# Patient Record
Sex: FEMALE | Race: WHITE | NOT HISPANIC OR LATINO | Employment: OTHER | URBAN - METROPOLITAN AREA
[De-identification: names, ages, dates, MRNs, and addresses within clinical notes are randomized per-mention and may not be internally consistent; named-entity substitution may affect disease eponyms.]

---

## 2021-01-01 ENCOUNTER — HOSPICE ADMISSION (OUTPATIENT)
Dept: HOSPICE | Facility: HOSPICE | Age: 66
End: 2021-01-01
Payer: MEDICARE

## 2021-01-01 ENCOUNTER — HOME CARE VISIT (OUTPATIENT)
Dept: HOSPICE | Facility: HOSPICE | Age: 66
End: 2021-01-01
Payer: MEDICARE

## 2021-01-01 ENCOUNTER — APPOINTMENT (OUTPATIENT)
Dept: RADIOLOGY | Facility: MEDICAL CENTER | Age: 66
DRG: 640 | End: 2021-01-01
Attending: INTERNAL MEDICINE
Payer: MEDICARE

## 2021-01-01 ENCOUNTER — APPOINTMENT (OUTPATIENT)
Dept: RADIOLOGY | Facility: MEDICAL CENTER | Age: 66
DRG: 640 | End: 2021-01-01
Attending: STUDENT IN AN ORGANIZED HEALTH CARE EDUCATION/TRAINING PROGRAM
Payer: MEDICARE

## 2021-01-01 ENCOUNTER — HOSPITAL ENCOUNTER (INPATIENT)
Facility: MEDICAL CENTER | Age: 66
LOS: 3 days | DRG: 640 | End: 2021-06-05
Attending: EMERGENCY MEDICINE | Admitting: STUDENT IN AN ORGANIZED HEALTH CARE EDUCATION/TRAINING PROGRAM
Payer: MEDICARE

## 2021-01-01 ENCOUNTER — APPOINTMENT (OUTPATIENT)
Dept: CARDIOLOGY | Facility: MEDICAL CENTER | Age: 66
DRG: 640 | End: 2021-01-01
Attending: INTERNAL MEDICINE
Payer: MEDICARE

## 2021-01-01 VITALS
DIASTOLIC BLOOD PRESSURE: 70 MMHG | WEIGHT: 87.3 LBS | HEIGHT: 61 IN | HEART RATE: 107 BPM | TEMPERATURE: 99.5 F | SYSTOLIC BLOOD PRESSURE: 115 MMHG | OXYGEN SATURATION: 93 % | RESPIRATION RATE: 14 BRPM | BODY MASS INDEX: 16.48 KG/M2

## 2021-01-01 DIAGNOSIS — E87.6 HYPOKALEMIA: ICD-10-CM

## 2021-01-01 DIAGNOSIS — R64 EMACIATED (HCC): ICD-10-CM

## 2021-01-01 DIAGNOSIS — N17.9 AKI (ACUTE KIDNEY INJURY) (HCC): ICD-10-CM

## 2021-01-01 DIAGNOSIS — E87.0 HYPERNATREMIA: ICD-10-CM

## 2021-01-01 DIAGNOSIS — M06.9 RHEUMATOID ARTHRITIS, INVOLVING UNSPECIFIED SITE, UNSPECIFIED WHETHER RHEUMATOID FACTOR PRESENT (HCC): ICD-10-CM

## 2021-01-01 DIAGNOSIS — E87.8 ELECTROLYTE ABNORMALITY: ICD-10-CM

## 2021-01-01 DIAGNOSIS — G93.49 UREMIC ENCEPHALOPATHY: ICD-10-CM

## 2021-01-01 DIAGNOSIS — N19 UREMIC ENCEPHALOPATHY: ICD-10-CM

## 2021-01-01 DIAGNOSIS — E43 SEVERE PROTEIN-CALORIE MALNUTRITION (HCC): ICD-10-CM

## 2021-01-01 LAB
ALBUMIN SERPL BCP-MCNC: 2.5 G/DL (ref 3.2–4.9)
ALBUMIN SERPL BCP-MCNC: 3 G/DL (ref 3.2–4.9)
ALBUMIN/GLOB SERPL: 1 G/DL
ALBUMIN/GLOB SERPL: 1.1 G/DL
ALP SERPL-CCNC: 44 U/L (ref 30–99)
ALP SERPL-CCNC: 57 U/L (ref 30–99)
ALT SERPL-CCNC: 78 U/L (ref 2–50)
ALT SERPL-CCNC: 91 U/L (ref 2–50)
AMORPH CRY #/AREA URNS HPF: PRESENT /HPF
ANION GAP SERPL CALC-SCNC: 12 MMOL/L (ref 7–16)
ANION GAP SERPL CALC-SCNC: 13 MMOL/L (ref 7–16)
ANION GAP SERPL CALC-SCNC: 5 MMOL/L (ref 7–16)
ANION GAP SERPL CALC-SCNC: 7 MMOL/L (ref 7–16)
ANION GAP SERPL CALC-SCNC: 9 MMOL/L (ref 7–16)
ANION GAP SERPL CALC-SCNC: 9 MMOL/L (ref 7–16)
APPEARANCE UR: CLEAR
AST SERPL-CCNC: 75 U/L (ref 12–45)
AST SERPL-CCNC: 76 U/L (ref 12–45)
BACTERIA #/AREA URNS HPF: NEGATIVE /HPF
BASOPHILS # BLD AUTO: 0.1 % (ref 0–1.8)
BASOPHILS # BLD AUTO: 0.1 % (ref 0–1.8)
BASOPHILS # BLD: 0.01 K/UL (ref 0–0.12)
BASOPHILS # BLD: 0.01 K/UL (ref 0–0.12)
BILIRUB SERPL-MCNC: 0.2 MG/DL (ref 0.1–1.5)
BILIRUB SERPL-MCNC: 0.3 MG/DL (ref 0.1–1.5)
BILIRUB UR QL STRIP.AUTO: NEGATIVE
BUN SERPL-MCNC: 14 MG/DL (ref 8–22)
BUN SERPL-MCNC: 19 MG/DL (ref 8–22)
BUN SERPL-MCNC: 28 MG/DL (ref 8–22)
BUN SERPL-MCNC: 39 MG/DL (ref 8–22)
BUN SERPL-MCNC: 42 MG/DL (ref 8–22)
BUN SERPL-MCNC: 48 MG/DL (ref 8–22)
BUN SERPL-MCNC: 61 MG/DL (ref 8–22)
BUN SERPL-MCNC: 76 MG/DL (ref 8–22)
BUN SERPL-MCNC: 80 MG/DL (ref 8–22)
CA-I SERPL-SCNC: 1 MMOL/L (ref 1.1–1.3)
CA-I SERPL-SCNC: 1.1 MMOL/L (ref 1.1–1.3)
CA-I SERPL-SCNC: 1.1 MMOL/L (ref 1.1–1.3)
CA-I SERPL-SCNC: 1.2 MMOL/L (ref 1.1–1.3)
CALCIUM SERPL-MCNC: 7.2 MG/DL (ref 8.5–10.5)
CALCIUM SERPL-MCNC: 7.6 MG/DL (ref 8.5–10.5)
CALCIUM SERPL-MCNC: 7.8 MG/DL (ref 8.5–10.5)
CALCIUM SERPL-MCNC: 7.8 MG/DL (ref 8.5–10.5)
CALCIUM SERPL-MCNC: 8.1 MG/DL (ref 8.5–10.5)
CALCIUM SERPL-MCNC: 8.2 MG/DL (ref 8.5–10.5)
CALCIUM SERPL-MCNC: 8.3 MG/DL (ref 8.5–10.5)
CALCIUM SERPL-MCNC: 8.5 MG/DL (ref 8.5–10.5)
CALCIUM SERPL-MCNC: 8.7 MG/DL (ref 8.5–10.5)
CHLORIDE SERPL-SCNC: 111 MMOL/L (ref 96–112)
CHLORIDE SERPL-SCNC: 115 MMOL/L (ref 96–112)
CHLORIDE SERPL-SCNC: 117 MMOL/L (ref 96–112)
CHLORIDE SERPL-SCNC: 117 MMOL/L (ref 96–112)
CHLORIDE SERPL-SCNC: 118 MMOL/L (ref 96–112)
CHLORIDE SERPL-SCNC: 118 MMOL/L (ref 96–112)
CHLORIDE SERPL-SCNC: 120 MMOL/L (ref 96–112)
CHLORIDE SERPL-SCNC: 121 MMOL/L (ref 96–112)
CHLORIDE SERPL-SCNC: 122 MMOL/L (ref 96–112)
CK SERPL-CCNC: 1566 U/L (ref 0–154)
CO2 SERPL-SCNC: 35 MMOL/L (ref 20–33)
CO2 SERPL-SCNC: 35 MMOL/L (ref 20–33)
CO2 SERPL-SCNC: 36 MMOL/L (ref 20–33)
CO2 SERPL-SCNC: 36 MMOL/L (ref 20–33)
CO2 SERPL-SCNC: 37 MMOL/L (ref 20–33)
CO2 SERPL-SCNC: 37 MMOL/L (ref 20–33)
CO2 SERPL-SCNC: 38 MMOL/L (ref 20–33)
CO2 SERPL-SCNC: 38 MMOL/L (ref 20–33)
CO2 SERPL-SCNC: 40 MMOL/L (ref 20–33)
COLOR UR: YELLOW
CREAT SERPL-MCNC: 0.38 MG/DL (ref 0.5–1.4)
CREAT SERPL-MCNC: 0.41 MG/DL (ref 0.5–1.4)
CREAT SERPL-MCNC: 0.48 MG/DL (ref 0.5–1.4)
CREAT SERPL-MCNC: 0.59 MG/DL (ref 0.5–1.4)
CREAT SERPL-MCNC: 0.62 MG/DL (ref 0.5–1.4)
CREAT SERPL-MCNC: 0.73 MG/DL (ref 0.5–1.4)
CREAT SERPL-MCNC: 0.76 MG/DL (ref 0.5–1.4)
CREAT SERPL-MCNC: 0.96 MG/DL (ref 0.5–1.4)
CREAT SERPL-MCNC: 1.03 MG/DL (ref 0.5–1.4)
CRP SERPL HS-MCNC: 0.41 MG/DL (ref 0–0.75)
CRP SERPL HS-MCNC: 0.49 MG/DL (ref 0–0.75)
EKG IMPRESSION: NORMAL
EKG IMPRESSION: NORMAL
EOSINOPHIL # BLD AUTO: 0.01 K/UL (ref 0–0.51)
EOSINOPHIL # BLD AUTO: 0.57 K/UL (ref 0–0.51)
EOSINOPHIL NFR BLD: 0.1 % (ref 0–6.9)
EOSINOPHIL NFR BLD: 5.1 % (ref 0–6.9)
EPI CELLS #/AREA URNS HPF: ABNORMAL /HPF
ERYTHROCYTE [DISTWIDTH] IN BLOOD BY AUTOMATED COUNT: 56.6 FL (ref 35.9–50)
ERYTHROCYTE [DISTWIDTH] IN BLOOD BY AUTOMATED COUNT: 59.2 FL (ref 35.9–50)
GLOBULIN SER CALC-MCNC: 2.3 G/DL (ref 1.9–3.5)
GLOBULIN SER CALC-MCNC: 2.9 G/DL (ref 1.9–3.5)
GLUCOSE SERPL-MCNC: 107 MG/DL (ref 65–99)
GLUCOSE SERPL-MCNC: 108 MG/DL (ref 65–99)
GLUCOSE SERPL-MCNC: 131 MG/DL (ref 65–99)
GLUCOSE SERPL-MCNC: 132 MG/DL (ref 65–99)
GLUCOSE SERPL-MCNC: 137 MG/DL (ref 65–99)
GLUCOSE SERPL-MCNC: 154 MG/DL (ref 65–99)
GLUCOSE SERPL-MCNC: 158 MG/DL (ref 65–99)
GLUCOSE SERPL-MCNC: 167 MG/DL (ref 65–99)
GLUCOSE SERPL-MCNC: 167 MG/DL (ref 65–99)
GLUCOSE UR STRIP.AUTO-MCNC: NEGATIVE MG/DL
HCT VFR BLD AUTO: 33.8 % (ref 37–47)
HCT VFR BLD AUTO: 40.2 % (ref 37–47)
HGB BLD-MCNC: 10.5 G/DL (ref 12–16)
HGB BLD-MCNC: 12.8 G/DL (ref 12–16)
HYALINE CASTS #/AREA URNS LPF: ABNORMAL /LPF
IMM GRANULOCYTES # BLD AUTO: 0.08 K/UL (ref 0–0.11)
IMM GRANULOCYTES # BLD AUTO: 0.16 K/UL (ref 0–0.11)
IMM GRANULOCYTES NFR BLD AUTO: 0.7 % (ref 0–0.9)
IMM GRANULOCYTES NFR BLD AUTO: 0.9 % (ref 0–0.9)
KETONES UR STRIP.AUTO-MCNC: NEGATIVE MG/DL
LEUKOCYTE ESTERASE UR QL STRIP.AUTO: NEGATIVE
LV EJECT FRACT  99904: 65
LV EJECT FRACT MOD 4C 99902: 65.47
LYMPHOCYTES # BLD AUTO: 1.17 K/UL (ref 1–4.8)
LYMPHOCYTES # BLD AUTO: 1.2 K/UL (ref 1–4.8)
LYMPHOCYTES NFR BLD: 10.8 % (ref 22–41)
LYMPHOCYTES NFR BLD: 6.9 % (ref 22–41)
MAGNESIUM SERPL-MCNC: 2.1 MG/DL (ref 1.5–2.5)
MAGNESIUM SERPL-MCNC: 2.2 MG/DL (ref 1.5–2.5)
MAGNESIUM SERPL-MCNC: 2.4 MG/DL (ref 1.5–2.5)
MAGNESIUM SERPL-MCNC: 2.5 MG/DL (ref 1.5–2.5)
MAGNESIUM SERPL-MCNC: 2.9 MG/DL (ref 1.5–2.5)
MAGNESIUM SERPL-MCNC: 2.9 MG/DL (ref 1.5–2.5)
MAGNESIUM SERPL-MCNC: 3.1 MG/DL (ref 1.5–2.5)
MCH RBC QN AUTO: 29.8 PG (ref 27–33)
MCH RBC QN AUTO: 29.9 PG (ref 27–33)
MCHC RBC AUTO-ENTMCNC: 31.1 G/DL (ref 33.6–35)
MCHC RBC AUTO-ENTMCNC: 31.8 G/DL (ref 33.6–35)
MCV RBC AUTO: 93.5 FL (ref 81.4–97.8)
MCV RBC AUTO: 96.3 FL (ref 81.4–97.8)
MICRO URNS: ABNORMAL
MONOCYTES # BLD AUTO: 0.33 K/UL (ref 0–0.85)
MONOCYTES # BLD AUTO: 0.74 K/UL (ref 0–0.85)
MONOCYTES NFR BLD AUTO: 3 % (ref 0–13.4)
MONOCYTES NFR BLD AUTO: 4.3 % (ref 0–13.4)
NEUTROPHILS # BLD AUTO: 14.98 K/UL (ref 2–7.15)
NEUTROPHILS # BLD AUTO: 8.9 K/UL (ref 2–7.15)
NEUTROPHILS NFR BLD: 80.3 % (ref 44–72)
NEUTROPHILS NFR BLD: 87.7 % (ref 44–72)
NITRITE UR QL STRIP.AUTO: NEGATIVE
NRBC # BLD AUTO: 0 K/UL
NRBC # BLD AUTO: 0 K/UL
NRBC BLD-RTO: 0 /100 WBC
NRBC BLD-RTO: 0 /100 WBC
PH UR STRIP.AUTO: 6 [PH] (ref 5–8)
PHOSPHATE SERPL-MCNC: 1.5 MG/DL (ref 2.5–4.5)
PHOSPHATE SERPL-MCNC: 1.6 MG/DL (ref 2.5–4.5)
PHOSPHATE SERPL-MCNC: 2.1 MG/DL (ref 2.5–4.5)
PHOSPHATE SERPL-MCNC: 2.6 MG/DL (ref 2.5–4.5)
PHOSPHATE SERPL-MCNC: 3.1 MG/DL (ref 2.5–4.5)
PHOSPHATE SERPL-MCNC: 3.3 MG/DL (ref 2.5–4.5)
PHOSPHATE SERPL-MCNC: 4.7 MG/DL (ref 2.5–4.5)
PLATELET # BLD AUTO: 174 K/UL (ref 164–446)
PLATELET # BLD AUTO: 241 K/UL (ref 164–446)
PMV BLD AUTO: 12.2 FL (ref 9–12.9)
PMV BLD AUTO: 12.6 FL (ref 9–12.9)
POTASSIUM SERPL-SCNC: 2.1 MMOL/L (ref 3.6–5.5)
POTASSIUM SERPL-SCNC: 2.2 MMOL/L (ref 3.6–5.5)
POTASSIUM SERPL-SCNC: 3 MMOL/L (ref 3.6–5.5)
POTASSIUM SERPL-SCNC: 3.1 MMOL/L (ref 3.6–5.5)
POTASSIUM SERPL-SCNC: 3.2 MMOL/L (ref 3.6–5.5)
POTASSIUM SERPL-SCNC: 3.5 MMOL/L (ref 3.6–5.5)
POTASSIUM SERPL-SCNC: 3.9 MMOL/L (ref 3.6–5.5)
POTASSIUM SERPL-SCNC: 4.1 MMOL/L (ref 3.6–5.5)
POTASSIUM SERPL-SCNC: <1.5 MMOL/L (ref 3.6–5.5)
PREALB SERPL-MCNC: 12.1 MG/DL (ref 18–38)
PROT SERPL-MCNC: 4.8 G/DL (ref 6–8.2)
PROT SERPL-MCNC: 5.9 G/DL (ref 6–8.2)
PROT UR QL STRIP: 30 MG/DL
RBC # BLD AUTO: 3.51 M/UL (ref 4.2–5.4)
RBC # BLD AUTO: 4.3 M/UL (ref 4.2–5.4)
RBC # URNS HPF: ABNORMAL /HPF
RBC UR QL AUTO: ABNORMAL
SODIUM SERPL-SCNC: 158 MMOL/L (ref 135–145)
SODIUM SERPL-SCNC: 158 MMOL/L (ref 135–145)
SODIUM SERPL-SCNC: 160 MMOL/L (ref 135–145)
SODIUM SERPL-SCNC: 161 MMOL/L (ref 135–145)
SODIUM SERPL-SCNC: 162 MMOL/L (ref 135–145)
SODIUM SERPL-SCNC: 162 MMOL/L (ref 135–145)
SODIUM SERPL-SCNC: 164 MMOL/L (ref 135–145)
SODIUM SERPL-SCNC: 170 MMOL/L (ref 135–145)
SODIUM SERPL-SCNC: 172 MMOL/L (ref 135–145)
SP GR UR STRIP.AUTO: 1.01
TROPONIN T SERPL-MCNC: 101 NG/L (ref 6–19)
TROPONIN T SERPL-MCNC: 106 NG/L (ref 6–19)
TROPONIN T SERPL-MCNC: 152 NG/L (ref 6–19)
TROPONIN T SERPL-MCNC: 94 NG/L (ref 6–19)
UROBILINOGEN UR STRIP.AUTO-MCNC: 0.2 MG/DL
WBC # BLD AUTO: 11.1 K/UL (ref 4.8–10.8)
WBC # BLD AUTO: 17.1 K/UL (ref 4.8–10.8)
WBC #/AREA URNS HPF: ABNORMAL /HPF

## 2021-01-01 PROCEDURE — 700101 HCHG RX REV CODE 250: Performed by: STUDENT IN AN ORGANIZED HEALTH CARE EDUCATION/TRAINING PROGRAM

## 2021-01-01 PROCEDURE — 80048 BASIC METABOLIC PNL TOTAL CA: CPT

## 2021-01-01 PROCEDURE — 700105 HCHG RX REV CODE 258: Performed by: INTERNAL MEDICINE

## 2021-01-01 PROCEDURE — 83735 ASSAY OF MAGNESIUM: CPT | Mod: 91

## 2021-01-01 PROCEDURE — 700101 HCHG RX REV CODE 250: Performed by: INTERNAL MEDICINE

## 2021-01-01 PROCEDURE — 99232 SBSQ HOSP IP/OBS MODERATE 35: CPT | Performed by: HOSPITALIST

## 2021-01-01 PROCEDURE — 770004 HCHG ROOM/CARE - ONCOLOGY PRIVATE *

## 2021-01-01 PROCEDURE — 700111 HCHG RX REV CODE 636 W/ 250 OVERRIDE (IP): Performed by: HOSPITALIST

## 2021-01-01 PROCEDURE — 80053 COMPREHEN METABOLIC PANEL: CPT

## 2021-01-01 PROCEDURE — 81001 URINALYSIS AUTO W/SCOPE: CPT

## 2021-01-01 PROCEDURE — 76937 US GUIDE VASCULAR ACCESS: CPT

## 2021-01-01 PROCEDURE — 700111 HCHG RX REV CODE 636 W/ 250 OVERRIDE (IP): Performed by: INTERNAL MEDICINE

## 2021-01-01 PROCEDURE — 99233 SBSQ HOSP IP/OBS HIGH 50: CPT | Performed by: HOSPITALIST

## 2021-01-01 PROCEDURE — 86140 C-REACTIVE PROTEIN: CPT

## 2021-01-01 PROCEDURE — 85025 COMPLETE CBC W/AUTO DIFF WBC: CPT

## 2021-01-01 PROCEDURE — B54MZZA ULTRASONOGRAPHY OF RIGHT UPPER EXTREMITY VEINS, GUIDANCE: ICD-10-PCS | Performed by: INTERNAL MEDICINE

## 2021-01-01 PROCEDURE — 93010 ELECTROCARDIOGRAM REPORT: CPT | Performed by: INTERNAL MEDICINE

## 2021-01-01 PROCEDURE — 84100 ASSAY OF PHOSPHORUS: CPT | Mod: 91

## 2021-01-01 PROCEDURE — 71045 X-RAY EXAM CHEST 1 VIEW: CPT

## 2021-01-01 PROCEDURE — 84100 ASSAY OF PHOSPHORUS: CPT

## 2021-01-01 PROCEDURE — 84484 ASSAY OF TROPONIN QUANT: CPT

## 2021-01-01 PROCEDURE — 97162 PT EVAL MOD COMPLEX 30 MIN: CPT

## 2021-01-01 PROCEDURE — 97166 OT EVAL MOD COMPLEX 45 MIN: CPT

## 2021-01-01 PROCEDURE — 93005 ELECTROCARDIOGRAM TRACING: CPT | Performed by: INTERNAL MEDICINE

## 2021-01-01 PROCEDURE — 82330 ASSAY OF CALCIUM: CPT

## 2021-01-01 PROCEDURE — 94760 N-INVAS EAR/PLS OXIMETRY 1: CPT

## 2021-01-01 PROCEDURE — 99233 SBSQ HOSP IP/OBS HIGH 50: CPT | Performed by: NURSE PRACTITIONER

## 2021-01-01 PROCEDURE — 302136 NUTRITION PUMP: Performed by: INTERNAL MEDICINE

## 2021-01-01 PROCEDURE — 82330 ASSAY OF CALCIUM: CPT | Mod: 91

## 2021-01-01 PROCEDURE — 70450 CT HEAD/BRAIN W/O DYE: CPT

## 2021-01-01 PROCEDURE — A9270 NON-COVERED ITEM OR SERVICE: HCPCS | Performed by: INTERNAL MEDICINE

## 2021-01-01 PROCEDURE — 99233 SBSQ HOSP IP/OBS HIGH 50: CPT | Performed by: INTERNAL MEDICINE

## 2021-01-01 PROCEDURE — 93005 ELECTROCARDIOGRAM TRACING: CPT | Performed by: EMERGENCY MEDICINE

## 2021-01-01 PROCEDURE — 99291 CRITICAL CARE FIRST HOUR: CPT

## 2021-01-01 PROCEDURE — 93306 TTE W/DOPPLER COMPLETE: CPT | Mod: 26 | Performed by: INTERNAL MEDICINE

## 2021-01-01 PROCEDURE — 96374 THER/PROPH/DIAG INJ IV PUSH: CPT

## 2021-01-01 PROCEDURE — 700111 HCHG RX REV CODE 636 W/ 250 OVERRIDE (IP): Performed by: STUDENT IN AN ORGANIZED HEALTH CARE EDUCATION/TRAINING PROGRAM

## 2021-01-01 PROCEDURE — 05HY33Z INSERTION OF INFUSION DEVICE INTO UPPER VEIN, PERCUTANEOUS APPROACH: ICD-10-PCS | Performed by: INTERNAL MEDICINE

## 2021-01-01 PROCEDURE — 84134 ASSAY OF PREALBUMIN: CPT

## 2021-01-01 PROCEDURE — 700102 HCHG RX REV CODE 250 W/ 637 OVERRIDE(OP): Performed by: INTERNAL MEDICINE

## 2021-01-01 PROCEDURE — 306565 RIGID MIT RESTRAINT(PAIR): Performed by: INTERNAL MEDICINE

## 2021-01-01 PROCEDURE — 82550 ASSAY OF CK (CPK): CPT

## 2021-01-01 PROCEDURE — 99291 CRITICAL CARE FIRST HOUR: CPT | Performed by: INTERNAL MEDICINE

## 2021-01-01 PROCEDURE — 80048 BASIC METABOLIC PNL TOTAL CA: CPT | Mod: 91

## 2021-01-01 PROCEDURE — 770001 HCHG ROOM/CARE - MED/SURG/GYN PRIV*

## 2021-01-01 PROCEDURE — 99291 CRITICAL CARE FIRST HOUR: CPT | Performed by: STUDENT IN AN ORGANIZED HEALTH CARE EDUCATION/TRAINING PROGRAM

## 2021-01-01 PROCEDURE — 99292 CRITICAL CARE ADDL 30 MIN: CPT | Performed by: INTERNAL MEDICINE

## 2021-01-01 PROCEDURE — 770022 HCHG ROOM/CARE - ICU (200)

## 2021-01-01 PROCEDURE — 36415 COLL VENOUS BLD VENIPUNCTURE: CPT

## 2021-01-01 PROCEDURE — 93306 TTE W/DOPPLER COMPLETE: CPT

## 2021-01-01 RX ORDER — POTASSIUM CHLORIDE 7.45 MG/ML
10 INJECTION INTRAVENOUS
Status: COMPLETED | OUTPATIENT
Start: 2021-01-01 | End: 2021-01-01

## 2021-01-01 RX ORDER — LEVETIRACETAM 5 MG/ML
500 INJECTION INTRAVASCULAR EVERY 12 HOURS
Status: DISCONTINUED | OUTPATIENT
Start: 2021-01-01 | End: 2021-01-01 | Stop reason: HOSPADM

## 2021-01-01 RX ORDER — ONDANSETRON 4 MG/1
4 TABLET, ORALLY DISINTEGRATING ORAL EVERY 4 HOURS PRN
Status: DISCONTINUED | OUTPATIENT
Start: 2021-01-01 | End: 2021-01-01

## 2021-01-01 RX ORDER — ACETAMINOPHEN 325 MG/1
650 TABLET ORAL EVERY 6 HOURS PRN
Status: DISCONTINUED | OUTPATIENT
Start: 2021-01-01 | End: 2021-01-01

## 2021-01-01 RX ORDER — CALCIUM CHLORIDE 100 MG/ML
1 INJECTION INTRAVENOUS; INTRAVENTRICULAR ONCE
Status: DISCONTINUED | OUTPATIENT
Start: 2021-01-01 | End: 2021-01-01

## 2021-01-01 RX ORDER — ONDANSETRON 8 MG/1
8 TABLET, ORALLY DISINTEGRATING ORAL EVERY 8 HOURS PRN
Status: DISCONTINUED | OUTPATIENT
Start: 2021-01-01 | End: 2021-01-01 | Stop reason: HOSPADM

## 2021-01-01 RX ORDER — LEVETIRACETAM 100 MG/ML
1500 SOLUTION ORAL 2 TIMES DAILY
COMMUNITY

## 2021-01-01 RX ORDER — HALOPERIDOL 5 MG/ML
1-2 INJECTION INTRAMUSCULAR EVERY 4 HOURS PRN
Status: DISCONTINUED | OUTPATIENT
Start: 2021-01-01 | End: 2021-01-01

## 2021-01-01 RX ORDER — AMOXICILLIN 250 MG
2 CAPSULE ORAL 2 TIMES DAILY
Status: DISCONTINUED | OUTPATIENT
Start: 2021-01-01 | End: 2021-01-01

## 2021-01-01 RX ORDER — DEXTROSE MONOHYDRATE 50 MG/ML
INJECTION, SOLUTION INTRAVENOUS CONTINUOUS
Status: DISCONTINUED | OUTPATIENT
Start: 2021-01-01 | End: 2021-01-01

## 2021-01-01 RX ORDER — ACETAMINOPHEN 650 MG/1
650 SUPPOSITORY RECTAL EVERY 4 HOURS PRN
Status: DISCONTINUED | OUTPATIENT
Start: 2021-01-01 | End: 2021-01-01 | Stop reason: HOSPADM

## 2021-01-01 RX ORDER — SODIUM CHLORIDE, SODIUM LACTATE, POTASSIUM CHLORIDE, CALCIUM CHLORIDE 600; 310; 30; 20 MG/100ML; MG/100ML; MG/100ML; MG/100ML
INJECTION, SOLUTION INTRAVENOUS CONTINUOUS
Status: DISCONTINUED | OUTPATIENT
Start: 2021-01-01 | End: 2021-01-01

## 2021-01-01 RX ORDER — BISACODYL 10 MG
10 SUPPOSITORY, RECTAL RECTAL
Status: DISCONTINUED | OUTPATIENT
Start: 2021-01-01 | End: 2021-01-01 | Stop reason: HOSPADM

## 2021-01-01 RX ORDER — POTASSIUM CHLORIDE 7.45 MG/ML
10 INJECTION INTRAVENOUS ONCE
Status: COMPLETED | OUTPATIENT
Start: 2021-01-01 | End: 2021-01-01

## 2021-01-01 RX ORDER — GAUZE BANDAGE 2" X 2"
100 BANDAGE TOPICAL 2 TIMES DAILY
Status: DISCONTINUED | OUTPATIENT
Start: 2021-01-01 | End: 2021-01-01

## 2021-01-01 RX ORDER — OXYCODONE HYDROCHLORIDE 5 MG/1
5 TABLET ORAL EVERY 4 HOURS PRN
Status: DISCONTINUED | OUTPATIENT
Start: 2021-01-01 | End: 2021-01-01

## 2021-01-01 RX ORDER — POLYVINYL ALCOHOL 14 MG/ML
2 SOLUTION/ DROPS OPHTHALMIC EVERY 6 HOURS PRN
Status: DISCONTINUED | OUTPATIENT
Start: 2021-01-01 | End: 2021-01-01 | Stop reason: HOSPADM

## 2021-01-01 RX ORDER — BISACODYL 10 MG
10 SUPPOSITORY, RECTAL RECTAL
Status: DISCONTINUED | OUTPATIENT
Start: 2021-01-01 | End: 2021-01-01

## 2021-01-01 RX ORDER — MORPHINE SULFATE 10 MG/ML
10 INJECTION, SOLUTION INTRAMUSCULAR; INTRAVENOUS
Status: DISCONTINUED | OUTPATIENT
Start: 2021-01-01 | End: 2021-01-01 | Stop reason: HOSPADM

## 2021-01-01 RX ORDER — SODIUM CHLORIDE, SODIUM LACTATE, POTASSIUM CHLORIDE, AND CALCIUM CHLORIDE .6; .31; .03; .02 G/100ML; G/100ML; G/100ML; G/100ML
1000 INJECTION, SOLUTION INTRAVENOUS ONCE
Status: COMPLETED | OUTPATIENT
Start: 2021-01-01 | End: 2021-01-01

## 2021-01-01 RX ORDER — ONDANSETRON 2 MG/ML
4 INJECTION INTRAMUSCULAR; INTRAVENOUS EVERY 4 HOURS PRN
Status: DISCONTINUED | OUTPATIENT
Start: 2021-01-01 | End: 2021-01-01

## 2021-01-01 RX ORDER — LORAZEPAM 2 MG/ML
1 INJECTION INTRAMUSCULAR
Status: DISCONTINUED | OUTPATIENT
Start: 2021-01-01 | End: 2021-01-01 | Stop reason: HOSPADM

## 2021-01-01 RX ORDER — LACTULOSE 20 G/30ML
10 SOLUTION ORAL
Status: DISCONTINUED | OUTPATIENT
Start: 2021-01-01 | End: 2021-01-01 | Stop reason: HOSPADM

## 2021-01-01 RX ORDER — SODIUM CHLORIDE AND POTASSIUM CHLORIDE 150; 900 MG/100ML; MG/100ML
INJECTION, SOLUTION INTRAVENOUS CONTINUOUS
Status: DISCONTINUED | OUTPATIENT
Start: 2021-01-01 | End: 2021-01-01

## 2021-01-01 RX ORDER — POLYETHYLENE GLYCOL 3350 17 G/17G
1 POWDER, FOR SOLUTION ORAL
Status: DISCONTINUED | OUTPATIENT
Start: 2021-01-01 | End: 2021-01-01

## 2021-01-01 RX ORDER — LORAZEPAM 2 MG/ML
1 CONCENTRATE ORAL
Status: DISCONTINUED | OUTPATIENT
Start: 2021-01-01 | End: 2021-01-01 | Stop reason: HOSPADM

## 2021-01-01 RX ORDER — ONDANSETRON 2 MG/ML
8 INJECTION INTRAMUSCULAR; INTRAVENOUS EVERY 8 HOURS PRN
Status: DISCONTINUED | OUTPATIENT
Start: 2021-01-01 | End: 2021-01-01 | Stop reason: HOSPADM

## 2021-01-01 RX ORDER — LABETALOL HYDROCHLORIDE 5 MG/ML
10 INJECTION, SOLUTION INTRAVENOUS EVERY 4 HOURS PRN
Status: DISCONTINUED | OUTPATIENT
Start: 2021-01-01 | End: 2021-01-01

## 2021-01-01 RX ORDER — DEXTROSE MONOHYDRATE, SODIUM CHLORIDE, AND POTASSIUM CHLORIDE 50; 1.49; 4.5 G/1000ML; G/1000ML; G/1000ML
INJECTION, SOLUTION INTRAVENOUS CONTINUOUS
Status: DISCONTINUED | OUTPATIENT
Start: 2021-01-01 | End: 2021-01-01

## 2021-01-01 RX ORDER — HEPARIN SODIUM 5000 [USP'U]/ML
5000 INJECTION, SOLUTION INTRAVENOUS; SUBCUTANEOUS EVERY 8 HOURS
Status: DISCONTINUED | OUTPATIENT
Start: 2021-01-01 | End: 2021-01-01

## 2021-01-01 RX ORDER — POTASSIUM CHLORIDE 7.45 MG/ML
10 INJECTION INTRAVENOUS
Status: DISPENSED | OUTPATIENT
Start: 2021-01-01 | End: 2021-01-01

## 2021-01-01 RX ORDER — ENALAPRILAT 1.25 MG/ML
1.25 INJECTION INTRAVENOUS EVERY 6 HOURS PRN
Status: DISCONTINUED | OUTPATIENT
Start: 2021-01-01 | End: 2021-01-01

## 2021-01-01 RX ORDER — ACETAMINOPHEN 325 MG/1
650 TABLET ORAL EVERY 4 HOURS PRN
Status: DISCONTINUED | OUTPATIENT
Start: 2021-01-01 | End: 2021-01-01 | Stop reason: HOSPADM

## 2021-01-01 RX ORDER — MAGNESIUM SULFATE HEPTAHYDRATE 40 MG/ML
2 INJECTION, SOLUTION INTRAVENOUS EVERY 8 HOURS PRN
Status: DISCONTINUED | OUTPATIENT
Start: 2021-01-01 | End: 2021-01-01

## 2021-01-01 RX ORDER — ATROPINE SULFATE 10 MG/ML
2 SOLUTION/ DROPS OPHTHALMIC EVERY 4 HOURS PRN
Status: DISCONTINUED | OUTPATIENT
Start: 2021-01-01 | End: 2021-01-01 | Stop reason: HOSPADM

## 2021-01-01 RX ORDER — HEPARIN SODIUM 5000 [USP'U]/ML
5000 INJECTION, SOLUTION INTRAVENOUS; SUBCUTANEOUS EVERY 12 HOURS
Status: DISCONTINUED | OUTPATIENT
Start: 2021-01-01 | End: 2021-01-01

## 2021-01-01 RX ORDER — LEVETIRACETAM 500 MG/1
1500 TABLET ORAL 2 TIMES DAILY
Status: ON HOLD | COMMUNITY
End: 2021-01-01

## 2021-01-01 RX ADMIN — DOCUSATE SODIUM 50 MG AND SENNOSIDES 8.6 MG 2 TABLET: 8.6; 5 TABLET, FILM COATED ORAL at 18:06

## 2021-01-01 RX ADMIN — POTASSIUM CHLORIDE 10 MEQ: 7.46 INJECTION, SOLUTION INTRAVENOUS at 04:54

## 2021-01-01 RX ADMIN — HALOPERIDOL LACTATE 1 MG: 5 INJECTION, SOLUTION INTRAMUSCULAR at 14:29

## 2021-01-01 RX ADMIN — HALOPERIDOL LACTATE 2 MG: 5 INJECTION, SOLUTION INTRAMUSCULAR at 01:27

## 2021-01-01 RX ADMIN — LEVETIRACETAM INJECTION 500 MG: 5 INJECTION INTRAVENOUS at 05:13

## 2021-01-01 RX ADMIN — POTASSIUM CHLORIDE 10 MEQ: 7.46 INJECTION, SOLUTION INTRAVENOUS at 17:17

## 2021-01-01 RX ADMIN — LEVETIRACETAM INJECTION 500 MG: 5 INJECTION INTRAVENOUS at 19:55

## 2021-01-01 RX ADMIN — POTASSIUM CHLORIDE 10 MEQ: 7.46 INJECTION, SOLUTION INTRAVENOUS at 04:55

## 2021-01-01 RX ADMIN — POTASSIUM CHLORIDE 10 MEQ: 10 INJECTION, SOLUTION INTRAVENOUS at 15:45

## 2021-01-01 RX ADMIN — SODIUM CHLORIDE, POTASSIUM CHLORIDE, SODIUM LACTATE AND CALCIUM CHLORIDE: 600; 310; 30; 20 INJECTION, SOLUTION INTRAVENOUS at 14:59

## 2021-01-01 RX ADMIN — POTASSIUM CHLORIDE 10 MEQ: 7.46 INJECTION, SOLUTION INTRAVENOUS at 05:28

## 2021-01-01 RX ADMIN — OXYCODONE 5 MG: 5 TABLET ORAL at 11:30

## 2021-01-01 RX ADMIN — HEPARIN SODIUM 5000 UNITS: 5000 INJECTION, SOLUTION INTRAVENOUS; SUBCUTANEOUS at 05:52

## 2021-01-01 RX ADMIN — POTASSIUM CHLORIDE 10 MEQ: 7.46 INJECTION, SOLUTION INTRAVENOUS at 08:18

## 2021-01-01 RX ADMIN — LEVETIRACETAM INJECTION 500 MG: 5 INJECTION INTRAVENOUS at 04:02

## 2021-01-01 RX ADMIN — POTASSIUM PHOSPHATE, MONOBASIC AND POTASSIUM PHOSPHATE, DIBASIC 15 MMOL: 224; 236 INJECTION, SOLUTION, CONCENTRATE INTRAVENOUS at 07:35

## 2021-01-01 RX ADMIN — POTASSIUM CHLORIDE 10 MEQ: 7.46 INJECTION, SOLUTION INTRAVENOUS at 07:13

## 2021-01-01 RX ADMIN — POTASSIUM CHLORIDE 10 MEQ: 7.46 INJECTION, SOLUTION INTRAVENOUS at 02:47

## 2021-01-01 RX ADMIN — DEXTROSE MONOHYDRATE: 50 INJECTION, SOLUTION INTRAVENOUS at 07:00

## 2021-01-01 RX ADMIN — POTASSIUM CHLORIDE 10 MEQ: 7.46 INJECTION, SOLUTION INTRAVENOUS at 07:35

## 2021-01-01 RX ADMIN — POTASSIUM CHLORIDE 10 MEQ: 7.46 INJECTION, SOLUTION INTRAVENOUS at 06:27

## 2021-01-01 RX ADMIN — OXYCODONE 5 MG: 5 TABLET ORAL at 00:32

## 2021-01-01 RX ADMIN — POTASSIUM CHLORIDE 10 MEQ: 10 INJECTION, SOLUTION INTRAVENOUS at 14:59

## 2021-01-01 RX ADMIN — POTASSIUM CHLORIDE 10 MEQ: 10 INJECTION, SOLUTION INTRAVENOUS at 21:46

## 2021-01-01 RX ADMIN — SODIUM CHLORIDE, POTASSIUM CHLORIDE, SODIUM LACTATE AND CALCIUM CHLORIDE: 600; 310; 30; 20 INJECTION, SOLUTION INTRAVENOUS at 13:09

## 2021-01-01 RX ADMIN — POTASSIUM CHLORIDE 10 MEQ: 10 INJECTION, SOLUTION INTRAVENOUS at 22:38

## 2021-01-01 RX ADMIN — HEPARIN SODIUM 5000 UNITS: 5000 INJECTION, SOLUTION INTRAVENOUS; SUBCUTANEOUS at 17:17

## 2021-01-01 RX ADMIN — POTASSIUM CHLORIDE 10 MEQ: 7.46 INJECTION, SOLUTION INTRAVENOUS at 03:24

## 2021-01-01 RX ADMIN — LORAZEPAM 1 MG: 2 INJECTION INTRAMUSCULAR; INTRAVENOUS at 22:36

## 2021-01-01 RX ADMIN — MORPHINE SULFATE 10 MG: 10 INJECTION INTRAVENOUS at 09:24

## 2021-01-01 RX ADMIN — DOCUSATE SODIUM 50 MG AND SENNOSIDES 8.6 MG 2 TABLET: 8.6; 5 TABLET, FILM COATED ORAL at 05:00

## 2021-01-01 RX ADMIN — HEPARIN SODIUM 5000 UNITS: 5000 INJECTION, SOLUTION INTRAVENOUS; SUBCUTANEOUS at 18:06

## 2021-01-01 RX ADMIN — POTASSIUM CHLORIDE 10 MEQ: 10 INJECTION, SOLUTION INTRAVENOUS at 17:51

## 2021-01-01 RX ADMIN — POTASSIUM CHLORIDE 10 MEQ: 10 INJECTION, SOLUTION INTRAVENOUS at 16:49

## 2021-01-01 RX ADMIN — Medication 100 MG: at 17:16

## 2021-01-01 RX ADMIN — LEVETIRACETAM INJECTION 500 MG: 5 INJECTION INTRAVENOUS at 18:05

## 2021-01-01 RX ADMIN — POTASSIUM CHLORIDE, DEXTROSE MONOHYDRATE AND SODIUM CHLORIDE: 150; 5; 450 INJECTION, SOLUTION INTRAVENOUS at 03:00

## 2021-01-01 RX ADMIN — POTASSIUM CHLORIDE 10 MEQ: 7.46 INJECTION, SOLUTION INTRAVENOUS at 04:53

## 2021-01-01 RX ADMIN — SODIUM CHLORIDE, POTASSIUM CHLORIDE, SODIUM LACTATE AND CALCIUM CHLORIDE: 600; 310; 30; 20 INJECTION, SOLUTION INTRAVENOUS at 02:32

## 2021-01-01 RX ADMIN — POTASSIUM PHOSPHATE, MONOBASIC AND POTASSIUM PHOSPHATE, DIBASIC 15 MMOL: 224; 236 INJECTION, SOLUTION, CONCENTRATE INTRAVENOUS at 15:46

## 2021-01-01 RX ADMIN — HALOPERIDOL LACTATE 2 MG: 5 INJECTION, SOLUTION INTRAMUSCULAR at 22:38

## 2021-01-01 RX ADMIN — POTASSIUM CHLORIDE 10 MEQ: 7.46 INJECTION, SOLUTION INTRAVENOUS at 03:36

## 2021-01-01 RX ADMIN — Medication 100 MG: at 18:06

## 2021-01-01 RX ADMIN — POTASSIUM CHLORIDE 10 MEQ: 10 INJECTION, SOLUTION INTRAVENOUS at 00:00

## 2021-01-01 RX ADMIN — HEPARIN SODIUM 5000 UNITS: 5000 INJECTION, SOLUTION INTRAVENOUS; SUBCUTANEOUS at 05:00

## 2021-01-01 RX ADMIN — POTASSIUM CHLORIDE 10 MEQ: 10 INJECTION, SOLUTION INTRAVENOUS at 06:00

## 2021-01-01 RX ADMIN — Medication 100 MG: at 05:00

## 2021-01-01 RX ADMIN — SODIUM CHLORIDE, POTASSIUM CHLORIDE, SODIUM LACTATE AND CALCIUM CHLORIDE 1000 ML: 600; 310; 30; 20 INJECTION, SOLUTION INTRAVENOUS at 05:52

## 2021-01-01 RX ADMIN — SODIUM CHLORIDE, POTASSIUM CHLORIDE, SODIUM LACTATE AND CALCIUM CHLORIDE: 600; 310; 30; 20 INJECTION, SOLUTION INTRAVENOUS at 14:43

## 2021-01-01 RX ADMIN — POTASSIUM CHLORIDE 10 MEQ: 7.46 INJECTION, SOLUTION INTRAVENOUS at 05:53

## 2021-01-01 RX ADMIN — CALCIUM CHLORIDE 1000 MG: 100 INJECTION, SOLUTION INTRAVENOUS at 15:46

## 2021-01-01 RX ADMIN — POTASSIUM CHLORIDE 10 MEQ: 10 INJECTION, SOLUTION INTRAVENOUS at 08:17

## 2021-01-01 RX ADMIN — POTASSIUM CHLORIDE 10 MEQ: 7.46 INJECTION, SOLUTION INTRAVENOUS at 02:39

## 2021-01-01 RX ADMIN — SODIUM CHLORIDE, POTASSIUM CHLORIDE, SODIUM LACTATE AND CALCIUM CHLORIDE: 600; 310; 30; 20 INJECTION, SOLUTION INTRAVENOUS at 02:49

## 2021-01-01 RX ADMIN — HEPARIN SODIUM 5000 UNITS: 5000 INJECTION, SOLUTION INTRAVENOUS; SUBCUTANEOUS at 05:17

## 2021-01-01 RX ADMIN — POTASSIUM CHLORIDE 10 MEQ: 7.46 INJECTION, SOLUTION INTRAVENOUS at 10:30

## 2021-01-01 RX ADMIN — LORAZEPAM 1 MG: 2 INJECTION INTRAMUSCULAR; INTRAVENOUS at 08:15

## 2021-01-01 RX ADMIN — POTASSIUM CHLORIDE 10 MEQ: 10 INJECTION, SOLUTION INTRAVENOUS at 09:11

## 2021-01-01 ASSESSMENT — COGNITIVE AND FUNCTIONAL STATUS - GENERAL
DRESSING REGULAR LOWER BODY CLOTHING: TOTAL
EATING MEALS: TOTAL
MOVING TO AND FROM BED TO CHAIR: UNABLE
TOILETING: TOTAL
CLIMB 3 TO 5 STEPS WITH RAILING: TOTAL
STANDING UP FROM CHAIR USING ARMS: TOTAL
MOVING TO AND FROM BED TO CHAIR: UNABLE
DRESSING REGULAR UPPER BODY CLOTHING: TOTAL
SUGGESTED CMS G CODE MODIFIER MOBILITY: CN
SUGGESTED CMS G CODE MODIFIER DAILY ACTIVITY: CL
DAILY ACTIVITIY SCORE: 10
CLIMB 3 TO 5 STEPS WITH RAILING: TOTAL
SUGGESTED CMS G CODE MODIFIER MOBILITY: CN
STANDING UP FROM CHAIR USING ARMS: TOTAL
PERSONAL GROOMING: A LOT
DRESSING REGULAR LOWER BODY CLOTHING: A LOT
PERSONAL GROOMING: TOTAL
HELP NEEDED FOR BATHING: TOTAL
DAILY ACTIVITIY SCORE: 6
SUGGESTED CMS G CODE MODIFIER DAILY ACTIVITY: CN
MOVING FROM LYING ON BACK TO SITTING ON SIDE OF FLAT BED: UNABLE
DRESSING REGULAR UPPER BODY CLOTHING: A LOT
DRESSING REGULAR LOWER BODY CLOTHING: TOTAL
DRESSING REGULAR UPPER BODY CLOTHING: TOTAL
WALKING IN HOSPITAL ROOM: TOTAL
CLIMB 3 TO 5 STEPS WITH RAILING: TOTAL
SUGGESTED CMS G CODE MODIFIER DAILY ACTIVITY: CN
PERSONAL GROOMING: TOTAL
WALKING IN HOSPITAL ROOM: TOTAL
TOILETING: TOTAL
TOILETING: TOTAL
DAILY ACTIVITIY SCORE: 6
TURNING FROM BACK TO SIDE WHILE IN FLAT BAD: UNABLE
HELP NEEDED FOR BATHING: TOTAL
MOBILITY SCORE: 6
TURNING FROM BACK TO SIDE WHILE IN FLAT BAD: UNABLE
EATING MEALS: A LOT
TURNING FROM BACK TO SIDE WHILE IN FLAT BAD: UNABLE
EATING MEALS: TOTAL
MOBILITY SCORE: 6
STANDING UP FROM CHAIR USING ARMS: TOTAL
WALKING IN HOSPITAL ROOM: TOTAL
MOVING FROM LYING ON BACK TO SITTING ON SIDE OF FLAT BED: UNABLE
HELP NEEDED FOR BATHING: TOTAL
MOVING TO AND FROM BED TO CHAIR: UNABLE

## 2021-01-01 ASSESSMENT — PAIN DESCRIPTION - PAIN TYPE
TYPE: ACUTE PAIN
TYPE: ACUTE PAIN;CHRONIC PAIN
TYPE: ACUTE PAIN;CHRONIC PAIN
TYPE: ACUTE PAIN
TYPE: ACUTE PAIN;CHRONIC PAIN
TYPE: ACUTE PAIN
TYPE: ACUTE PAIN;CHRONIC PAIN
TYPE: ACUTE PAIN
TYPE: ACUTE PAIN
TYPE: ACUTE PAIN;CHRONIC PAIN

## 2021-01-01 ASSESSMENT — FIBROSIS 4 INDEX
FIB4 SCORE: 2.21
FIB4 SCORE: 3.27
FIB4 SCORE: 3.27
FIB4 SCORE: 2.21

## 2021-01-01 ASSESSMENT — ENCOUNTER SYMPTOMS
HEADACHES: 0
ABDOMINAL PAIN: 0
DEPRESSION: 0
INSOMNIA: 0
HEARTBURN: 0
NAUSEA: 1
WHEEZING: 0
BRUISES/BLEEDS EASILY: 0
FOCAL WEAKNESS: 0
BLURRED VISION: 0
LOSS OF CONSCIOUSNESS: 0
FEVER: 0
WEAKNESS: 0
SORE THROAT: 0
DIARRHEA: 0
COUGH: 0
DOUBLE VISION: 0
VOMITING: 0
SHORTNESS OF BREATH: 0
CHILLS: 0
BACK PAIN: 0
PALPITATIONS: 0
BLOOD IN STOOL: 0
NECK PAIN: 0
CONSTIPATION: 0

## 2021-01-01 ASSESSMENT — GAIT ASSESSMENTS: GAIT LEVEL OF ASSIST: UNABLE TO PARTICIPATE

## 2021-01-01 ASSESSMENT — ACTIVITIES OF DAILY LIVING (ADL): TOILETING: REQUIRES ASSIST

## 2021-06-02 PROBLEM — E87.0 HYPERNATREMIA: Status: ACTIVE | Noted: 2021-01-01

## 2021-06-02 PROBLEM — N19 UREMIC ENCEPHALOPATHY: Status: ACTIVE | Noted: 2021-01-01

## 2021-06-02 PROBLEM — M06.9 RHEUMATOID ARTHRITIS (HCC): Status: ACTIVE | Noted: 2021-01-01

## 2021-06-02 PROBLEM — R79.89 ELEVATED TROPONIN: Status: ACTIVE | Noted: 2021-01-01

## 2021-06-02 PROBLEM — G93.49 UREMIC ENCEPHALOPATHY: Status: ACTIVE | Noted: 2021-01-01

## 2021-06-02 PROBLEM — R79.89 AZOTEMIA: Status: ACTIVE | Noted: 2021-01-01

## 2021-06-02 PROBLEM — E87.6 HYPOKALEMIA: Status: ACTIVE | Noted: 2021-01-01

## 2021-06-02 PROBLEM — R64 EMACIATED (HCC): Status: ACTIVE | Noted: 2021-01-01

## 2021-06-02 PROBLEM — N17.9 AKI (ACUTE KIDNEY INJURY) (HCC): Status: ACTIVE | Noted: 2021-01-01

## 2021-06-02 PROBLEM — E43 SEVERE PROTEIN-CALORIE MALNUTRITION (HCC): Status: ACTIVE | Noted: 2021-01-01

## 2021-06-02 PROBLEM — R11.0 NAUSEA: Status: ACTIVE | Noted: 2021-01-01

## 2021-06-02 NOTE — ED TRIAGE NOTES
Kristel Ocampo    Chief Complaint   Patient presents with   • ALOC     PT was transfered from another facility for ALOC and abnormal labs       Vitals:    06/02/21 0015   Pulse: 74   Resp: 16   Temp: 37 °C (98.6 °F)   SpO2: 94%       ERP at bedside

## 2021-06-02 NOTE — CONSULTS
Critical Care Consultation    Date of consult: 6/2/2021    Referring Physician  Dr. Kye Pierre    Reason for Consultation  Hypernatremia    History of Presenting Illness  65 y.o. female BMI 13, LTAC resident who presented 6/2/2021 with severe dehydration and life threatening hypernatremia of 170 and hypokalemia of 1.3. Creatinine 1.03, BUN 99. WBC 17K    At ED, pt's vitals are stable. PT was given 2L fluid boluses at ED and followed with maintaince fluid NS at 100cc/hr. ECG with ST depressions, no u wave or prolonged QT. Troponin 152    Of note, upon review of OSH, creatinine 1.3, WBC 17. Primary team attempted to contact family, but no success.     Code Status  Full Code    Review of Systems  Review of Systems   Reason unable to perform ROS: limited due to mental status.   All other systems reviewed and are negative.      Past Medical History   has no past medical history on file.    Surgical History   has no past surgical history on file.    Family History  family history is not on file.    Social History   reports that she has never smoked. She does not have any smokeless tobacco history on file. She reports previous alcohol use. She reports that she does not use drugs.    Medications  Home Medications     Reviewed by Cecilio Holliday (Pharmacy Tech) on 06/02/21 at 0226  Med List Status: Partial   Medication Last Dose Status   levETIRAcetam (KEPPRA) 500 MG Tab unknown Active              Current Facility-Administered Medications   Medication Dose Route Frequency Provider Last Rate Last Admin   • senna-docusate (PERICOLACE or SENOKOT S) 8.6-50 MG per tablet 2 tablet  2 tablet Oral BID Kye Pierre D.O.        And   • polyethylene glycol/lytes (MIRALAX) PACKET 1 Packet  1 Packet Oral QDAY PRN Kye Pierre D.O.        And   • magnesium hydroxide (MILK OF MAGNESIA) suspension 30 mL  30 mL Oral QDAY PRN Kye Pierre D.O.        And   • bisacodyl (DULCOLAX) suppository 10 mg  10 mg Rectal  QDAY PRN Kye Pierre, D.O.       • heparin injection 5,000 Units  5,000 Units Subcutaneous Q8HRS Kye Pierre D.O.       • acetaminophen (Tylenol) tablet 650 mg  650 mg Oral Q6HRS PRN Kye Pierre, D.O.       • enalaprilat (VASOTEC) injection 1.25 mg  1.25 mg Intravenous Q6HRS PRN Kye Pierre D.O.       • labetalol (NORMODYNE/TRANDATE) injection 10 mg  10 mg Intravenous Q4HRS PRN Kye Pierre, D.O.       • ondansetron (ZOFRAN) syringe/vial injection 4 mg  4 mg Intravenous Q4HRS PRN Kye Pierre D.O.       • ondansetron (ZOFRAN ODT) dispertab 4 mg  4 mg Oral Q4HRS PRN Kye Pierre, D.O.       • potassium chloride (KCL) ivpb 10 mEq  10 mEq Intravenous Q HOUR Kye Pierre D.O.       • dextrose 5 % and 0.45 % NaCl with KCl 20 mEq   Intravenous Continuous Kye Pierre, D.O.         Current Outpatient Medications   Medication Sig Dispense Refill   • levETIRAcetam (KEPPRA) 500 MG Tab Take 1,500 mg by mouth 2 times a day.         Allergies  Allergies   Allergen Reactions   • Biaxin [Clarithromycin]    • Prevacid [Lansoprazole]    • Sulfa Drugs        Vital Signs last 24 hours  Temp:  [37 °C (98.6 °F)] 37 °C (98.6 °F)  Pulse:  [74] 74  Resp:  [16] 16  SpO2:  [94 %] 94 %    Physical Exam  Physical Exam  Vitals and nursing note reviewed.   Constitutional:       General: She is not in acute distress.     Appearance: She is ill-appearing. She is not toxic-appearing.      Comments: cachectic   HENT:      Head: Normocephalic.      Mouth/Throat:      Mouth: Mucous membranes are dry.   Eyes:      Pupils: Pupils are equal, round, and reactive to light.   Cardiovascular:      Rate and Rhythm: Normal rate.      Pulses: Normal pulses.      Heart sounds: Normal heart sounds. No murmur heard.     Pulmonary:      Effort: No respiratory distress.      Breath sounds: Normal breath sounds. No wheezing, rhonchi or rales.      Comments: Diminished at bases  Abdominal:      General: There is no  distension.      Palpations: Abdomen is soft.      Tenderness: There is no abdominal tenderness. There is no guarding.   Musculoskeletal:      Right lower leg: No edema.      Left lower leg: No edema.      Comments: Extensive muscle atrophy in upper and lower ext bilat    Ulnar deviation of hand bilaterally  Could hardly move her fingers even with effort   Skin:     Capillary Refill: Capillary refill takes 2 to 3 seconds.      Coloration: Skin is not jaundiced.      Findings: No bruising or rash.   Neurological:      General: No focal deficit present.      Mental Status: She is alert.      Comments: Appropriate  ROM is limited         Fluids  No intake or output data in the 24 hours ending 21 0237    Laboratory  Recent Results (from the past 48 hour(s))   EKG    Collection Time: 21 12:10 AM   Result Value Ref Range    Report       [facility]    Test Date:  2021  Pt Name:    JANAY SANTOS                 Department: ER  MRN:        [id number]                      Room:       [facility]  Gender:     Female                       Technician: RN  :        [id number]                   Requested By:LATASHA WYNNE  Order #:    [id number]                    Reading MD:    Measurements  Intervals                                Axis  Rate:       73                           P:          0  ND:         205                          QRS:        82  QRSD:       84                           T:          269  QT:         420  QTc:        463    Interpretive Statements  SINUS RHYTHM  BORDERLINE RIGHT AXIS DEVIATION  CONSIDER LEFT VENTRICULAR HYPERTROPHY  REPOL ABNRM SUGGESTS ISCHEMIA, DIFFUSE LEADS  ARTIFACT IN LEAD(S) III,V3  No previous ECG available for comparison     CBC WITH DIFFERENTIAL    Collection Time: 21 12:28 AM   Result Value Ref Range    WBC 17.1 (H) 4.8 - 10.8 K/uL    RBC 4.30 4.20 - 5.40 M/uL    Hemoglobin 12.8 12.0 - 16.0 g/dL    Hematocrit 40.2 37.0 - 47.0 %     MCV 93.5 81.4 - 97.8 fL    MCH 29.8 27.0 - 33.0 pg    MCHC 31.8 (L) 33.6 - 35.0 g/dL    RDW 56.6 (H) 35.9 - 50.0 fL    Platelet Count 241 164 - 446 K/uL    MPV 12.2 9.0 - 12.9 fL    Neutrophils-Polys 87.70 (H) 44.00 - 72.00 %    Lymphocytes 6.90 (L) 22.00 - 41.00 %    Monocytes 4.30 0.00 - 13.40 %    Eosinophils 0.10 0.00 - 6.90 %    Basophils 0.10 0.00 - 1.80 %    Immature Granulocytes 0.90 0.00 - 0.90 %    Nucleated RBC 0.00 /100 WBC    Neutrophils (Absolute) 14.98 (H) 2.00 - 7.15 K/uL    Lymphs (Absolute) 1.17 1.00 - 4.80 K/uL    Monos (Absolute) 0.74 0.00 - 0.85 K/uL    Eos (Absolute) 0.01 0.00 - 0.51 K/uL    Baso (Absolute) 0.01 0.00 - 0.12 K/uL    Immature Granulocytes (abs) 0.16 (H) 0.00 - 0.11 K/uL    NRBC (Absolute) 0.00 K/uL   COMP METABOLIC PANEL    Collection Time: 06/02/21 12:28 AM   Result Value Ref Range    Sodium 170 (HH) 135 - 145 mmol/L    Potassium <1.5 (LL) 3.6 - 5.5 mmol/L    Chloride 121 (H) 96 - 112 mmol/L    Co2 36 (H) 20 - 33 mmol/L    Anion Gap 13.0 7.0 - 16.0    Glucose 137 (H) 65 - 99 mg/dL    Bun 80 (HH) 8 - 22 mg/dL    Creatinine 1.03 0.50 - 1.40 mg/dL    Calcium 7.8 (L) 8.5 - 10.5 mg/dL    AST(SGOT) 76 (H) 12 - 45 U/L    ALT(SGPT) 91 (H) 2 - 50 U/L    Alkaline Phosphatase 57 30 - 99 U/L    Total Bilirubin 0.2 0.1 - 1.5 mg/dL    Albumin 3.0 (L) 3.2 - 4.9 g/dL    Total Protein 5.9 (L) 6.0 - 8.2 g/dL    Globulin 2.9 1.9 - 3.5 g/dL    A-G Ratio 1.0 g/dL   TROPONIN    Collection Time: 06/02/21 12:28 AM   Result Value Ref Range    Troponin T 152 (H) 6 - 19 ng/L   MAGNESIUM    Collection Time: 06/02/21 12:28 AM   Result Value Ref Range    Magnesium 3.1 (H) 1.5 - 2.5 mg/dL   CREATINE KINASE    Collection Time: 06/02/21 12:28 AM   Result Value Ref Range    CPK Total 1566 (HH) 0 - 154 U/L   ESTIMATED GFR    Collection Time: 06/02/21 12:28 AM   Result Value Ref Range    GFR If African American >60 >60 mL/min/1.73 m 2    GFR If Non  53 (A) >60 mL/min/1.73 m 2       Imaging  CT-HEAD  W/O   Final Result      1. No CT evidence of acute infarct, hemorrhage or mass.   2. Mild to moderate global parenchymal atrophy. Chronic small vessel ischemic changes.      EC-ECHOCARDIOGRAM COMPLETE W/O CONT    (Results Pending)       Assessment/Plan  * Severe protein-calorie malnutrition (HCC)- (present on admission)  Assessment & Plan  Severe malnutrition. Cachectic on exam with muscle waste  Multiple electrolyte imbalance   High risk for refeeding  Need to replete all electrolytes before starting tube feed to prevent refeeding syndrome    Plan:   Correct hypokalemia and hypernatremia  Check phosphorus level, replete as needed  Keep K >4, Mg >2    Hypokalemia- (present on admission)  Assessment & Plan  Extreme life threatening hypokalemia at 1.3  S/p IV KCl 40meq x1  S/p 2L fluid boluses  ECG with no u wave, no QTc prolongation.     Plan:   Will give another liter LR  KCl 40 meq  Repeat BMP Q4H  Keep Mg >2. Last Mg 3.1  Repeat ECG        Hypernatremia- (present on admission)  Assessment & Plan  Due to extreme intravascular depletion.   S/p 2L fluid boluses in ED and on maintenance D5 0.5NS + 20 KCl at 100cc/hr.     Plan:   Check BMP Q4H  Goal to slowly correct sodium by 8-10 in next 24 hours  Continue fluids.     ALIRIO (acute kidney injury) (HCC)  Assessment & Plan  Likely due to prerenal and rhabdo  CK not terribly high  On fluids for hydration.   Making good UOP    Uremic encephalopathy- (present on admission)  Assessment & Plan  BUN in 99 with creatinine 1.0. At OSH creatinine was 1.3  Baseline creatinine is unknown but I suspect this is high for her given her low BMI  Pt is making good UOP  Continue fluids for hydration   Strict I/Os    Elevated troponin  Assessment & Plan  Pt denies chest pain. Troponin 152  Serial troponin  Repeat ECG  TTE today      Discussed patient condition and risk of morbidity and/or mortality with RN, RT, Pharmacy and Patient.    The patient remains critically ill.  Critical care time  = 46 minutes in directly providing and coordinating critical care and extensive data review.  No time overlap and excludes procedures.

## 2021-06-02 NOTE — PROGRESS NOTES
Med Rec completed: per Phone call to pt's daughter.     Daughter indicated that Keppra dosing is by mouth liquid at home, changed dosage form to match.     No ORAL antibiotics in last 14 days    Preferred Pharmacy: [facility] P: [phone]    Pt confirmed following allergies:  Allergies   Allergen Reactions   • Clarithromycin    • Lansoprazole    • Sulfa Drugs         Pt's home medications:   Medication Sig   • levETIRAcetam (KEPPRA) 100 MG/ML Solution Take 1,500 mg by mouth 2 times a day. 1,500mg = 15mL   • multivitamin (THERAGRAN) Tab Take 1 tablet by mouth every day.

## 2021-06-02 NOTE — ASSESSMENT & PLAN NOTE
Severe malnutrition. Cachectic on exam with muscle waste  Multiple electrolyte imbalance   High risk for refeeding  Initiate enteral nutrition, high risk for refeeding syndrome  Serial phosphorus, magnesium, calcium, potassium with aggressive replacement, scheduled thiamine

## 2021-06-02 NOTE — ASSESSMENT & PLAN NOTE
6/3 no vomiting and nausea appears controlled.  Prn Antiemetics ordered for nausea  Likely related to her level of uremia

## 2021-06-02 NOTE — PROGRESS NOTES
"Critical Care Progress Note    Date of admission  6/2/2021    Chief Complaint  65 y.o. female, PMH rheumatoid arthritis, admitted 6/2/2021 in transfer from outside facility for dehydration and abnormal labs, admitted to ICU with critical hypokalemia, 1.3 and sodium 170    Hospital Course  \"65 y.o. female BMI 13, LTAC resident who presented 6/2/2021 with severe dehydration and life threatening hypernatremia of 170 and hypokalemia of 1.3. Creatinine 1.03, BUN 99. WBC 17K  At ED, pt's vitals are stable. PT was given 2L fluid boluses at ED and followed with maintaince fluid NS at 100cc/hr. ECG with ST depressions, no u wave or prolonged QT. Troponin 152  Of note, upon review of OSH, creatinine 1.3, WBC 17. Primary team attempted to contact family, but no success. \"    Interval Problem Update  Reviewed last 24 hour events:  SR 70's  Soft BP, no pressors  Sodium 172, potassium 2.1  T-max 98.6  VSS  I/O = 1.4/1.3  cortrak placed  Place PICC  D5w and free water  Agitated, Pain  CXR clear  2 lpm oxygen  SC heparin  Add oxy prn        Review of Systems  Review of Systems   Unable to perform ROS: Acuity of condition        Vital Signs for last 24 hours   Temp:  [36.9 °C (98.4 °F)-37 °C (98.6 °F)] 36.9 °C (98.5 °F)  Pulse:  [71-78] 73  Resp:  [6-19] 11  BP: ()/(43-56) 101/53  SpO2:  [94 %-99 %] 96 %    Hemodynamic parameters for last 24 hours       Respiratory Information for the last 24 hours       Physical Exam   Physical Exam  Vitals and nursing note reviewed.   Constitutional:       General: She is not in acute distress.     Appearance: She is ill-appearing. She is not toxic-appearing.      Comments: cachectic   HENT:      Head: Normocephalic.      Mouth/Throat:      Mouth: Mucous membranes are dry.   Eyes:      Pupils: Pupils are equal, round, and reactive to light.   Cardiovascular:      Rate and Rhythm: Normal rate.      Pulses: Normal pulses.      Heart sounds: Normal heart sounds. No murmur heard.     Pulmonary: "      Effort: No respiratory distress.      Breath sounds: Normal breath sounds. No wheezing, rhonchi or rales.      Comments: Diminished at bases  Abdominal:      General: There is no distension.      Palpations: Abdomen is soft.      Tenderness: There is no abdominal tenderness. There is no guarding.   Musculoskeletal:      Right lower leg: No edema.      Left lower leg: No edema.      Comments: Extensive muscle atrophy in upper and lower ext bilat  Ulnar deviation of hands bilaterally   Skin:     Capillary Refill: Capillary refill takes 2 to 3 seconds.      Coloration: Skin is not jaundiced.      Findings: No bruising or rash.   Neurological:      General: No focal deficit present.      Mental Status: She is alert.      Comments: Follows commands but not oriented to place time or situation, intermittently agitated and yelling out  some contracture  ROM is limited         Medications  Current Facility-Administered Medications   Medication Dose Route Frequency Provider Last Rate Last Admin   • senna-docusate (PERICOLACE or SENOKOT S) 8.6-50 MG per tablet 2 tablet  2 tablet Oral BID Kye Pierre D.O.        And   • polyethylene glycol/lytes (MIRALAX) PACKET 1 Packet  1 Packet Oral QDAY PRN Kye Pierre, D.O.        And   • magnesium hydroxide (MILK OF MAGNESIA) suspension 30 mL  30 mL Oral QDAY PRN Kye Pierre, D.O.        And   • bisacodyl (DULCOLAX) suppository 10 mg  10 mg Rectal QDAY PRN Kye Pierre D.O.       • heparin injection 5,000 Units  5,000 Units Subcutaneous Q8HRS Kye Pierre D.O.   5,000 Units at 06/02/21 0552   • acetaminophen (Tylenol) tablet 650 mg  650 mg Oral Q6HRS PRN Kye Pierre D.O.       • enalaprilat (VASOTEC) injection 1.25 mg  1.25 mg Intravenous Q6HRS PRN Kye Pierre D.O.       • labetalol (NORMODYNE/TRANDATE) injection 10 mg  10 mg Intravenous Q4HRS PRN Kye Pierre D.O.       • ondansetron (ZOFRAN) syringe/vial injection 4 mg  4 mg  Intravenous Q4HRS PRN Kye Pierre D.O.       • ondansetron (ZOFRAN ODT) dispertab 4 mg  4 mg Oral Q4HRS PRN Kye Pierre D.O.       • potassium chloride (KCL) ivpb 10 mEq  10 mEq Intravenous Q HOUR Wilfred Alfredo D.O.   Stopped at 06/02/21 0659   • dextrose 5% infusion   Intravenous Continuous Wilfred Alfredo D.O. 100 mL/hr at 06/02/21 0700 New Bag at 06/02/21 0700       Fluids    Intake/Output Summary (Last 24 hours) at 6/2/2021 0807  Last data filed at 6/2/2021 0554  Gross per 24 hour   Intake 1480 ml   Output 100 ml   Net 1380 ml       Laboratory      Recent Labs     06/02/21  0028   CPKTOTAL 1566*     Recent Labs     06/02/21  0028 06/02/21  0530   SODIUM 170* 172*   POTASSIUM <1.5* 2.1*   CHLORIDE 121* 122*   CO2 36* 38*   BUN 80* 76*   CREATININE 1.03 0.96   MAGNESIUM 3.1*  --    PHOSPHORUS  --  3.3   CALCIUM 7.8* 7.6*     Recent Labs     06/02/21  0028 06/02/21  0530   ALTSGPT 91*  --    ASTSGOT 76*  --    ALKPHOSPHAT 57  --    TBILIRUBIN 0.2  --    GLUCOSE 137* 167*     Recent Labs     06/02/21  0028   WBC 17.1*   NEUTSPOLYS 87.70*   LYMPHOCYTES 6.90*   MONOCYTES 4.30   EOSINOPHILS 0.10   BASOPHILS 0.10   ASTSGOT 76*   ALTSGPT 91*   ALKPHOSPHAT 57   TBILIRUBIN 0.2     Recent Labs     06/02/21  0028   RBC 4.30   HEMOGLOBIN 12.8   HEMATOCRIT 40.2   PLATELETCT 241       Imaging  X-Ray:  I have personally reviewed the images and compared with prior images.    Assessment/Plan  * Severe protein-calorie malnutrition (HCC)- (present on admission)  Assessment & Plan  Severe malnutrition. Cachectic on exam with muscle waste  Multiple electrolyte imbalance   High risk for refeeding  Replacing all electrolytes then initiate enteral nutrition, monitor closely for refeeding syndrome  Serial phosphorus, magnesium, calcium, potassium with aggressive replacement, scheduled thiamine    Elevated troponin  Assessment & Plan  Pt denies chest pain. Troponin 152  Follow trop, EKG, echo    ALIRIO (acute kidney injury)  (HCC)  Assessment & Plan  Likely due to prerenal and rhabdo  IVF, f/u CPK, I/O    Rheumatoid arthritis (HCC)- (present on admission)  Assessment & Plan  By history; unclear treatment hx    Hypokalemia- (present on admission)  Assessment & Plan  Extreme life threatening hypokalemia at 1.3  ECG with no u wave, no QTc prolongation.   IV and PO replacement   Repeat BMP Q4H  Keep Mg >2        Uremic encephalopathy- (present on admission)  Assessment & Plan  BUN in 99 with creatinine 1.0. At OSH creatinine was 1.3  Baseline creatinine is unknown but I suspect this is high for her given her low BMI  Pt is making good UOP  Continue fluids for hydration   Strict I/Os    Hypernatremia- (present on admission)  Assessment & Plan  Due to extreme intravascular depletion.   Ongoing IVF and free water  Goal to slowly correct sodium by 8-10 in next 24 hours     Update: Initiate enteral nutrition, correct electrolytes aggressively and monitor for refeeding syndrome for which the patient is a high risk.   is contacting next of kin and will discuss with them when available.  Overall prognosis guarded and at high risk for deterioration.  CODE STATUS and goals of care will be addressed.    Update: Daughter at bedside now.  Endorsed DNR/DNI status.  Will have in-depth discussion with palliative care team.      VTE:  Heparin  Ulcer: Not Indicated  Lines: None    I have performed a physical exam and reviewed and updated ROS and Plan today (6/2/2021). In review of yesterday's note (6/1/2021), there are no changes except as documented above.     Discussed patient condition and risk of morbidity and/or mortality with RN, RT, Pharmacy and QA team  The patient remains critically ill.  Critical care time = 30 minutes in directly providing and coordinating critical care and extensive data review.  No time overlap and excludes procedures.

## 2021-06-02 NOTE — PROGRESS NOTES
Cortrak Placement    Tube Team verified patient name and medical record number prior to tube placement.  Cortrak tube (55 inches, 10 Pakistani) placed at 55 cm in left nare.  Per Cortrak picture, tube appears to be in the stomach.  Nursing Instructions: Awaiting KUB to confirm placement before use for medications or feeding. Once placement confirmed, flush tube with 30 ml of water, and then remove and save stylet, in patient medication drawer.

## 2021-06-02 NOTE — CONSULTS
"Reason for PC Consult: Advance Care Planning    Consulted by:   Dr. Alfredo    Assessment:  General:   65 year old female admitted for hypernatremia on 6/2/21. Pt has a history of rheumatoid arthritis, dementia and failure to thrive. Pt presented to [facility] with altered mentation. Pt was then transferred to [facility] ED for further management. Pt found to be dehydrated and electrolytes were off.     Prior PC Consult:   None on File    Social:   Pt lives at home with her daughter, Jaja, who is pt's primary care giver. Pt is bed bound with intermittent chair transfers.     Dyspnea: No, 96% on 1L NC  Last BM: 06/02/21  Pain: Unable to determine   Depression: Unable to determine   Dementia: Unable to Determine      Spiritual:  Is Christian or spirituality important for coping with this illness? No    Has a  or spiritual provider visit been requested? No    Palliative Performance Scale: 10%    Advance Directive: None on File    DPOA: None on File, NITIN Wilkinson Medema    POLST: None on File    To locate the AD/POLST, please hover the cursor over the patient's code status to find all linked ACP documents.    Code Status: DNR/DNI    Outcome:  PC RN discussed case with Dr. Tapia, appreciate updates.    PC RN visited pt and daughter Jaja at bedside, introduced self and role of PC. Jaja reports pt's change in mentation a few weeks ago. Pt started having anger outbursts and accusing Jaja of doing things she hasn't. Pt has been having visual hallucinations, saying she has something in her hand when she does not. Jaja reports a change in appetite, mentation and mobility. Prior to this last few weeks pt was able to assist with chair transfers, suddenly pt was unable to do this and she experienced a fall at home due to \"not knowing what she is doing\". Jaja states pt only drink the \"premier shakes\", one shake per meal so 3 shakes a day. Pt will also drink a half cup of apple juice sometimes. Otherwise pt does " "not consume any other food or water.     Baltazar reports being the only caregiver in the home, they do not have any other family to help care for pt in the home. Pt has expressed wanting to remain at home to die. She has told Baltazar to not call EMS if something were to happen, she just wants to be left at home to die. Baltazar expressed that she couldn't not call for help when pt started to get worse. Pt does not want any form of life support. Educated Baltazar on life sustaining and prolonging treatments. Long discussion about tube feedings and IVs. Baltazar verbalized that pt would not want to be treated, she would rather be at home and allowed to die naturally. Educated baltazar on the dying process and TF vs pleasure feeds.     Discussed hospice at home, philosophy, goals and support provided. Baltazar reports she cannot care for pt at home, especially if pt is confused and bed bound. Discussed private care attendants at home, Baltazar states they do not have the financial means to privately pay for help in the home. Discussed hospice at home vs SNF, Baltazar states pt did say a week ago that she wanted to be in a home. Baltazar expressed feeling like she \"failed\" her mother and thinks that pt doesn't think Baltazar is taking good enough care of her. Reassured Baltazar that she has done the best she could for pt.    Discussed comfort focused care now, Baltazar would like to continue current POC to see if pt's mentation can clear. Baltazar asked that if pt pulls anything out to not replace it. Baltazar feels that if pt pulls out an IV or TF she doesn't want it. Baltazar would also like to see if a SNF near their home in [place] would take pt with hospice care.     Discussed restraints, Baltazar requested to keep the mittens on so that the pt does not scratch herself.     Offered  services, Baltazar declined.    Provided business card, encouraged Baltazar to call with any questions or concerns.     Active listening, education, validation, normalization, " therapeutic touch, and emotional support provided throughout encounter.    Updated:   Dr. Tapia, Bedside RN    Plan:   Discharge to SNF with hospice once arranged. If pt pulls any tubes or wires dtr requested to NOT have them replaced. Jaja does not want to cause any pain or discomfort at this time.     Recommendations: I recommend a hospice consult.    *Recommendation does not provide clinical appropriateness for hospice nor does it provide that the patient would or would not qualify for hospice services.*     Thank you for allowing Palliative Care to participate in this patient's care. Please feel free to call [phone] with any questions or concerns.

## 2021-06-02 NOTE — ASSESSMENT & PLAN NOTE
Hospice ordered with plan for SNF near patient's home  Comfort care initiated so patient could eat per daughter's wish and aware of risk/plan 6/4

## 2021-06-02 NOTE — DIETARY
"Nutrition Support Assessment   Day 0 of admit.  Kristel Ocampo is a 65 y.o. female with admitting DX of dehydration, hypokalemia, hypernatremia.     Current problem list:  1. Severe protein-calorie malnutrition  2. Emaciated  3. Hypernatremia  4. Azotemia  5. Uremic encephalopathy  6. Hypokalemia  7. Nausea  8. Rheumatoid arthritis  9. ALIRIO  10. Elevated troponin     Assessment:  Estimated Nutritional Needs: based on:   Height: 154.9 cm (5' 0.98\")  Weight: 31.8 kg (70 lb 1.7 oz)  Weight to Use in Calculations: 31.8 kg (70 lb 1.7 oz)  Ideal Body Weight: 47.6 kg (105 lb)  Percent Ideal Body Weight: 66.8  Body mass index is 13.25 kg/m²., BMI classification: Underweight    Calculation/Equation: MSJ x 1.2-1.5 = 950 - 1186 kcal/day  Total Calories / day: 954 - 1272 (Calories / k - 40)  Total Grams Protein / day: 57 - 71 (Grams Protein / kg IBW: 1.2 - 1.5)  25 kcal/kg of IBW = 1190 kcal/day       Evaluation:   1. Consult received for nutrition support.   2. Cortrak placed and verified  3. Currently A&O x 1. Attempted to visit with patient but she was sound asleep.  Visually patient appears very thin an cachetic.  Unable to complete full NFPE as patient sleeping and this is not appropriate at this time.   4. Per MD note, plan is to replete electrolytes before starting Tube feed.  Discussed TF goal rate with DANTE Gunn and advised her to speak with MD to ensure they intend to start TF per ordered protocol.   5. Pertinent Meds: D5 @ 100 ml/hr (Providing 408 kcal/day), Haldol,   Zofran PRN, roxicodone PRN, Thiamine 100 mg.  KCL provided,   6. Pertinent labs :  Sodium: 164, Potassium: 2.2, Phos: 2.1, Magnesium: 2.9, Glucose: 158   7. Last BM this morning, small  8. Patient is appropriate for stand TF formula at this time.      Malnutrition Risk: Unable to strictly diagnose malnutrition as I am unable to obtain history and preform NFPE on patient however given visual appearance and BMI patient is likely malnourished.    "   Recommendations/Plan:  1. Start TF of Fibersource HN @ 20 ml/hr and advance slowly by 10 ml every 8-12 hours to goal rate of 40 ml/hr.  Goal rate will provide: 1152 kcal. 52 grams of protein and 778 ml free water per day.   2. Monitor for refeeding: Order BMP w/ Mg and Phos x 7 days. Replete K, Phos and Mg prn. Supplement 100 mg Thiamine x 7 days to reduce risk of refeeding  3. Fluids per MD.  4. PO diet when safe and appropriate.    RD monitoring

## 2021-06-02 NOTE — ED PROVIDER NOTES
ED Provider Note    CHIEF COMPLAINT  Chief Complaint   Patient presents with   • ALOC     PT was transfered from another facility for ALOC and abnormal labs       HPI    Primary care provider: Not on file  Means of arrival: EMS Transfer  History obtained from: Patient, records  History limited by: patient confused, poor historian    Kristel Ocampo is a 65 y.o. female who presents with concerns for electrolyte abnormalities and altered mental status.  The patient was seen at an outside hospital, apparently she was in a very hot apartment, and her daughter called 911.  Patient has a long history of RA and debility.  She has limited mobility at baseline.  Daughter found her to be confused, unknown last normal.  At outside facility she was found to have severe electrolyte disturbances including hyponatremia and hypokalemia.  No reported falls or injuries or trauma.  No aggravating or alleviating factors noted.  Apparently she got a liter of fluids from the outside facility.    Further history limited because patient is very confused    REVIEW OF SYSTEMS  Constitutional: Positive for confusion and fatigue.  HENT: Negative for rhinorrhea or sore throat.    Respiratory: Negative for cough or shortness of breath.    Cardiovascular: Negative for chest pain or syncope.   Gastrointestinal: Negative for vomiting or abdominal pain.  Genitourinary: Negative for dysuria or flank pain.   Musculoskeletal: Negative for back pain or joint pain.   Skin: Negative for itching or rash.   Neurological: Negative for sensory or motor changes.  Positive for altered mental status.    Further ROS limited patient is acutely confused.    PAST MEDICAL HISTORY  Rheumatoid arthritis, debility.    PAST FAMILY HISTORY  History reviewed. No pertinent family history.    SOCIAL HISTORY  Social History     Tobacco Use   • Smoking status: Never Smoker   Substance and Sexual Activity   • Alcohol use: Not Currently   • Drug use: Never   • Sexual activity:  "Not on file       SURGICAL HISTORY  patient denies any surgical history    CURRENT MEDICATIONS  Home Medications     Reviewed by Velia Reich R.N. (Registered Nurse) on 06/02/21 at 1759  Med List Status: Complete   Medication Last Dose Status   levETIRAcetam (KEPPRA) 100 MG/ML Solution unknown Active   multivitamin (THERAGRAN) Tab unknown Active                ALLERGIES  Allergies   Allergen Reactions   • Clarithromycin    • Lansoprazole    • Sulfa Drugs        PHYSICAL EXAM  VITAL SIGNS: /61   Pulse 81   Temp 36.8 °C (98.2 °F) (Temporal)   Resp 16   Ht 1.549 m (5' 0.98\")   Wt 40.5 kg (89 lb 4.6 oz)   SpO2 96%   BMI 16.88 kg/m²    Pulse ox interpretation: On room air, I interpret this pulse ox as normal.  Constitutional: Lying on stretcher in mild distress, cachectic.  HEENT: Normocephalic, atraumatic. Posterior pharynx clear, mucous membranes dry.  Eyes:  EOMI. Normal sclerae.  Neck: Supple, nontender.  Chest/Pulmonary: Diminished to ausculation bilaterally, no wheezes or rhonchi.  Cardiovascular: Regular rate and rhythm, subtle systolic murmur.   Abdomen: Soft, nontender; no rebound, guarding, or masses.  Back: No CVA or midline tenderness.   Musculoskeletal: Chronic sequelae of severe RA, no extremity swelling.  Neuro: Alert, no focal asymmetry, garbled speech at times.  Psych: Tired appearing and confused but cooperative.  Skin: No rashes, warm and dry.      DIAGNOSTIC STUDIES / PROCEDURES    LABS & EKG  Results for orders placed or performed during the hospital encounter of 06/02/21   EC-ECHOCARDIOGRAM COMPLETE W/O CONT   Result Value Ref Range    Eject.Frac. MOD 4C 65.47     Left Ventrical Ejection Fraction 65    CBC WITH DIFFERENTIAL   Result Value Ref Range    WBC 17.1 (H) 4.8 - 10.8 K/uL    RBC 4.30 4.20 - 5.40 M/uL    Hemoglobin 12.8 12.0 - 16.0 g/dL    Hematocrit 40.2 37.0 - 47.0 %    MCV 93.5 81.4 - 97.8 fL    MCH 29.8 27.0 - 33.0 pg    MCHC 31.8 (L) 33.6 - 35.0 g/dL    RDW 56.6 (H) 35.9 " - 50.0 fL    Platelet Count 241 164 - 446 K/uL    MPV 12.2 9.0 - 12.9 fL    Neutrophils-Polys 87.70 (H) 44.00 - 72.00 %    Lymphocytes 6.90 (L) 22.00 - 41.00 %    Monocytes 4.30 0.00 - 13.40 %    Eosinophils 0.10 0.00 - 6.90 %    Basophils 0.10 0.00 - 1.80 %    Immature Granulocytes 0.90 0.00 - 0.90 %    Nucleated RBC 0.00 /100 WBC    Neutrophils (Absolute) 14.98 (H) 2.00 - 7.15 K/uL    Lymphs (Absolute) 1.17 1.00 - 4.80 K/uL    Monos (Absolute) 0.74 0.00 - 0.85 K/uL    Eos (Absolute) 0.01 0.00 - 0.51 K/uL    Baso (Absolute) 0.01 0.00 - 0.12 K/uL    Immature Granulocytes (abs) 0.16 (H) 0.00 - 0.11 K/uL    NRBC (Absolute) 0.00 K/uL   COMP METABOLIC PANEL   Result Value Ref Range    Sodium 170 (HH) 135 - 145 mmol/L    Potassium <1.5 (LL) 3.6 - 5.5 mmol/L    Chloride 121 (H) 96 - 112 mmol/L    Co2 36 (H) 20 - 33 mmol/L    Anion Gap 13.0 7.0 - 16.0    Glucose 137 (H) 65 - 99 mg/dL    Bun 80 (HH) 8 - 22 mg/dL    Creatinine 1.03 0.50 - 1.40 mg/dL    Calcium 7.8 (L) 8.5 - 10.5 mg/dL    AST(SGOT) 76 (H) 12 - 45 U/L    ALT(SGPT) 91 (H) 2 - 50 U/L    Alkaline Phosphatase 57 30 - 99 U/L    Total Bilirubin 0.2 0.1 - 1.5 mg/dL    Albumin 3.0 (L) 3.2 - 4.9 g/dL    Total Protein 5.9 (L) 6.0 - 8.2 g/dL    Globulin 2.9 1.9 - 3.5 g/dL    A-G Ratio 1.0 g/dL   TROPONIN   Result Value Ref Range    Troponin T 152 (H) 6 - 19 ng/L   MAGNESIUM   Result Value Ref Range    Magnesium 3.1 (H) 1.5 - 2.5 mg/dL   CREATINE KINASE   Result Value Ref Range    CPK Total 1566 (HH) 0 - 154 U/L   URINALYSIS    Specimen: Urine, Clean Catch   Result Value Ref Range    Color Yellow     Character Clear     Specific Gravity 1.015 <1.035    Ph 6.0 5.0 - 8.0    Glucose Negative Negative mg/dL    Ketones Negative Negative mg/dL    Protein 30 (A) Negative mg/dL    Bilirubin Negative Negative    Urobilinogen, Urine 0.2 Negative    Nitrite Negative Negative    Leukocyte Esterase Negative Negative    Occult Blood Large (A) Negative    Micro Urine Req Microscopic     ESTIMATED GFR   Result Value Ref Range    GFR If African American >60 >60 mL/min/1.73 m 2    GFR If Non  53 (A) >60 mL/min/1.73 m 2   Basic Metabolic Panel   Result Value Ref Range    Sodium 172 (HH) 135 - 145 mmol/L    Potassium 2.1 (LL) 3.6 - 5.5 mmol/L    Chloride 122 (H) 96 - 112 mmol/L    Co2 38 (H) 20 - 33 mmol/L    Glucose 167 (H) 65 - 99 mg/dL    Bun 76 (HH) 8 - 22 mg/dL    Creatinine 0.96 0.50 - 1.40 mg/dL    Calcium 7.6 (L) 8.5 - 10.5 mg/dL    Anion Gap 12.0 7.0 - 16.0   TROPONIN   Result Value Ref Range    Troponin T 106 (H) 6 - 19 ng/L   PHOSPHORUS   Result Value Ref Range    Phosphorus 3.3 2.5 - 4.5 mg/dL   URINE MICROSCOPIC (W/UA)   Result Value Ref Range    WBC 5-10 (A) /hpf    RBC 0-2 /hpf    Bacteria Negative None /hpf    Epithelial Cells Few /hpf    Amorphous Crystal Present /hpf    Hyaline Cast 6-10 (A) /lpf   Basic Metabolic Panel   Result Value Ref Range    Sodium 164 (HH) 135 - 145 mmol/L    Potassium 2.2 (LL) 3.6 - 5.5 mmol/L    Chloride 120 (H) 96 - 112 mmol/L    Co2 37 (H) 20 - 33 mmol/L    Glucose 158 (H) 65 - 99 mg/dL    Bun 61 (H) 8 - 22 mg/dL    Creatinine 0.76 0.50 - 1.40 mg/dL    Calcium 7.2 (L) 8.5 - 10.5 mg/dL    Anion Gap 7.0 7.0 - 16.0   ESTIMATED GFR   Result Value Ref Range    GFR If African American >60 >60 mL/min/1.73 m 2    GFR If Non African American 58 (A) >60 mL/min/1.73 m 2   MAGNESIUM   Result Value Ref Range    Magnesium 2.9 (H) 1.5 - 2.5 mg/dL   PHOSPHORUS   Result Value Ref Range    Phosphorus 2.1 (L) 2.5 - 4.5 mg/dL   IONIZED CALCIUM   Result Value Ref Range    Ionized Calcium 1.0 (L) 1.1 - 1.3 mmol/L   CRP QUANTITIVE (NON-CARDIAC)   Result Value Ref Range    Stat C-Reactive Protein 0.49 0.00 - 0.75 mg/dL   MAGNESIUM   Result Value Ref Range    Magnesium 2.9 (H) 1.5 - 2.5 mg/dL   PHOSPHORUS   Result Value Ref Range    Phosphorus 4.7 (H) 2.5 - 4.5 mg/dL   ESTIMATED GFR   Result Value Ref Range    GFR If African American >60 >60 mL/min/1.73 m 2    GFR  If Non African American >60 >60 mL/min/1.73 m 2   TROPONIN   Result Value Ref Range    Troponin T 101 (H) 6 - 19 ng/L   TROPONIN   Result Value Ref Range    Troponin T 94 (H) 6 - 19 ng/L   Basic Metabolic Panel   Result Value Ref Range    Sodium 162 (HH) 135 - 145 mmol/L    Potassium 3.0 (L) 3.6 - 5.5 mmol/L    Chloride 117 (H) 96 - 112 mmol/L    Co2 36 (H) 20 - 33 mmol/L    Glucose 167 (H) 65 - 99 mg/dL    Bun 48 (H) 8 - 22 mg/dL    Creatinine 0.73 0.50 - 1.40 mg/dL    Calcium 8.7 8.5 - 10.5 mg/dL    Anion Gap 9.0 7.0 - 16.0   IONIZED CALCIUM   Result Value Ref Range    Ionized Calcium 1.2 1.1 - 1.3 mmol/L   MAGNESIUM   Result Value Ref Range    Magnesium 2.5 1.5 - 2.5 mg/dL   PHOSPHORUS   Result Value Ref Range    Phosphorus 2.6 2.5 - 4.5 mg/dL   ESTIMATED GFR   Result Value Ref Range    GFR If African American >60 >60 mL/min/1.73 m 2    GFR If Non African American >60 >60 mL/min/1.73 m 2   CBC with Differential   Result Value Ref Range    WBC 11.1 (H) 4.8 - 10.8 K/uL    RBC 3.51 (L) 4.20 - 5.40 M/uL    Hemoglobin 10.5 (L) 12.0 - 16.0 g/dL    Hematocrit 33.8 (L) 37.0 - 47.0 %    MCV 96.3 81.4 - 97.8 fL    MCH 29.9 27.0 - 33.0 pg    MCHC 31.1 (L) 33.6 - 35.0 g/dL    RDW 59.2 (H) 35.9 - 50.0 fL    Platelet Count 174 164 - 446 K/uL    MPV 12.6 9.0 - 12.9 fL    Neutrophils-Polys 80.30 (H) 44.00 - 72.00 %    Lymphocytes 10.80 (L) 22.00 - 41.00 %    Monocytes 3.00 0.00 - 13.40 %    Eosinophils 5.10 0.00 - 6.90 %    Basophils 0.10 0.00 - 1.80 %    Immature Granulocytes 0.70 0.00 - 0.90 %    Nucleated RBC 0.00 /100 WBC    Neutrophils (Absolute) 8.90 (H) 2.00 - 7.15 K/uL    Lymphs (Absolute) 1.20 1.00 - 4.80 K/uL    Monos (Absolute) 0.33 0.00 - 0.85 K/uL    Eos (Absolute) 0.57 (H) 0.00 - 0.51 K/uL    Baso (Absolute) 0.01 0.00 - 0.12 K/uL    Immature Granulocytes (abs) 0.08 0.00 - 0.11 K/uL    NRBC (Absolute) 0.00 K/uL   Comp Metabolic Panel (CMP)   Result Value Ref Range    Sodium 160 (HH) 135 - 145 mmol/L    Potassium 3.2  (L) 3.6 - 5.5 mmol/L    Chloride 118 (H) 96 - 112 mmol/L    Co2 35 (H) 20 - 33 mmol/L    Anion Gap 7.0 7.0 - 16.0    Glucose 131 (H) 65 - 99 mg/dL    Bun 42 (H) 8 - 22 mg/dL    Creatinine 0.62 0.50 - 1.40 mg/dL    Calcium 7.8 (L) 8.5 - 10.5 mg/dL    AST(SGOT) 75 (H) 12 - 45 U/L    ALT(SGPT) 78 (H) 2 - 50 U/L    Alkaline Phosphatase 44 30 - 99 U/L    Total Bilirubin 0.3 0.1 - 1.5 mg/dL    Albumin 2.5 (L) 3.2 - 4.9 g/dL    Total Protein 4.8 (L) 6.0 - 8.2 g/dL    Globulin 2.3 1.9 - 3.5 g/dL    A-G Ratio 1.1 g/dL   CRP Quantitive (Non-Cardiac)   Result Value Ref Range    Stat C-Reactive Protein 0.41 0.00 - 0.75 mg/dL   Prealbumin   Result Value Ref Range    Pre-Albumin 12.1 (L) 18.0 - 38.0 mg/dL   IONIZED CALCIUM   Result Value Ref Range    Ionized Calcium 1.1 1.1 - 1.3 mmol/L   Basic Metabolic Panel   Result Value Ref Range    Sodium 158 (HH) 135 - 145 mmol/L    Potassium 3.5 (L) 3.6 - 5.5 mmol/L    Chloride 118 (H) 96 - 112 mmol/L    Co2 35 (H) 20 - 33 mmol/L    Glucose 132 (H) 65 - 99 mg/dL    Bun 39 (H) 8 - 22 mg/dL    Creatinine 0.59 0.50 - 1.40 mg/dL    Calcium 8.3 (L) 8.5 - 10.5 mg/dL    Anion Gap 5.0 (L) 7.0 - 16.0   ESTIMATED GFR   Result Value Ref Range    GFR If African American >60 >60 mL/min/1.73 m 2    GFR If Non African American >60 >60 mL/min/1.73 m 2   IONIZED CALCIUM   Result Value Ref Range    Ionized Calcium 1.1 1.1 - 1.3 mmol/L   ESTIMATED GFR   Result Value Ref Range    GFR If African American >60 >60 mL/min/1.73 m 2    GFR If Non African American >60 >60 mL/min/1.73 m 2   Basic Metabolic Panel   Result Value Ref Range    Sodium 161 (HH) 135 - 145 mmol/L    Potassium 3.9 3.6 - 5.5 mmol/L    Chloride 117 (H) 96 - 112 mmol/L    Co2 37 (H) 20 - 33 mmol/L    Glucose 154 (H) 65 - 99 mg/dL    Bun 28 (H) 8 - 22 mg/dL    Creatinine 0.48 (L) 0.50 - 1.40 mg/dL    Calcium 8.2 (L) 8.5 - 10.5 mg/dL    Anion Gap 7.0 7.0 - 16.0   PHOSPHORUS   Result Value Ref Range    Phosphorus 1.5 (L) 2.5 - 4.5 mg/dL    MAGNESIUM   Result Value Ref Range    Magnesium 2.4 1.5 - 2.5 mg/dL   ESTIMATED GFR   Result Value Ref Range    GFR If African American >60 >60 mL/min/1.73 m 2    GFR If Non African American >60 >60 mL/min/1.73 m 2   EKG   Result Value Ref Range    Report       [facility] Emergency Dept.    Test Date:  2021  Pt Name:    [facility]                 Department: ER  MRN:        [id number]                      Room:       T627  Gender:     Female                       Technician: RN  :        [id number]                   Requested By:CHRISTOPHER WYNNE  Order #:    [id number]                    Reading MD: Christopher Wynne MD    Measurements  Intervals                                Axis  Rate:       73                           P:          0  PA:         205                          QRS:        82  QRSD:       84                           T:          269  QT:         420  QTc:        463    Interpretive Statements  SINUS RHYTHM  BORDERLINE RIGHT AXIS DEVIATION  CONSIDER LEFT VENTRICULAR HYPERTROPHY  REPOL ABNRM SUGGESTS ISCHEMIA, DIFFUSE LEADS  ARTIFACT IN LEAD(S) III,V3  No previous ECG available for comparison  No STEMI, strain pattern present  Electronically Signed On 2021 4:19:21 PDT by Christopher Wynne MD     EKG   Result Value Ref Range    Report       [facility]    Test Date:  2021  Pt Name:    [facility]                 Department: 161  MRN:        [id number]                      Room:       27  Gender:     Female                       Technician: DONOVANG  :        [id number]                   Requested By:KATTY EDGAR  Order #:    [id number]                    Reading MD: Naga Bradford MD    Measurements  Intervals                                Axis  Rate:       86                           P:          0  PA:         136                          QRS:        64  QRSD:       100                          T:          259  QT:         392  QTc:         469    Interpretive Statements  SINUS RHYTHM  REPOL ABNRM SUGGESTS ISCHEMIA, DIFFUSE LEADS  Compared to ECG 06/02/2021 00:10:56  No significant changes  Electronically Signed On 6-2-2021 6:41:48 PDT by Naga Bradford MD           RADIOLOGY  EC-ECHOCARDIOGRAM COMPLETE W/O CONT   Final Result      IR-MIDLINE CATHETER INSERTION WO GUIDANCE > AGE 3   Final Result                  Ultrasound-guided midline placement performed by qualified nursing staff    as above.          DX-CHEST-PORTABLE (1 VIEW)   Final Result      No acute cardiopulmonary abnormality.      DX-ABDOMEN FOR TUBE PLACEMENT   Final Result      Enteric feeding tube tip projects over the expected location of the proximal stomach.      CT-HEAD W/O   Final Result      1. No CT evidence of acute infarct, hemorrhage or mass.   2. Mild to moderate global parenchymal atrophy. Chronic small vessel ischemic changes.          COURSE & MEDICAL DECISION MAKING    This is a 65 y.o. female who presents with altered mental status, severe electrolyte disturbances.    Differential Diagnosis includes but is not limited to:  Dehydration, hypernatremia, hypokalemia, dysrhythmia, occult trauma, elder neglect    ED Course:  Unfortunate 65-year-old female with above concerns.  Given severe electrolyte derangements plan ICU admission.  Critical care team consulted they will kindly consult, hospitalist consulted they will kindly admit for further work-up and treatment.  Repeat labs at this facility are concerning, patient will receive potassium.  Thankfully she has no evidence of dysrhythmia at this time, nothing acute on head CT.  Patient hemodynamically stable for admission to the ICU in guarded condition.    Medications   enalaprilat (VASOTEC) injection 1.25 mg (has no administration in time range)   labetalol (NORMODYNE/TRANDATE) injection 10 mg (has no administration in time range)   ondansetron (ZOFRAN) syringe/vial injection 4 mg ( Intravenous Canceled Entry 6/3/21  2107)   potassium chloride (KCL) ivpb 10 mEq (0 mEq Intravenous Stopped 6/2/21 1011)   heparin injection 5,000 Units (5,000 Units Subcutaneous Given 6/3/21 1717)   Pharmacy Consult: Enteral tube insertion - review meds/change route/product selection (has no administration in time range)   senna-docusate (PERICOLACE or SENOKOT S) 8.6-50 MG per tablet 2 tablet (0 Tablets Enteral Tube Held 6/3/21 1800)     And   polyethylene glycol/lytes (MIRALAX) PACKET 1 Packet (has no administration in time range)     And   magnesium hydroxide (MILK OF MAGNESIA) suspension 30 mL (has no administration in time range)     And   bisacodyl (DULCOLAX) suppository 10 mg (has no administration in time range)   thiamine (Vitamin B-1) tablet 100 mg (100 mg Enteral Tube Given 6/3/21 1716)   acetaminophen (Tylenol) tablet 650 mg (has no administration in time range)   ondansetron (ZOFRAN ODT) dispertab 4 mg (has no administration in time range)   oxyCODONE immediate-release (ROXICODONE) tablet 5 mg (5 mg Enteral Tube Given 6/3/21 0032)   haloperidol lactate (HALDOL) injection 1-2 mg (2 mg Intravenous Given 6/4/21 0127)   potassium phosphate IVPB 30 mmol in 500 mL D5W (premix) ( Intravenous Canceled Entry 6/2/21 1430)   lactated ringers infusion ( Intravenous New Bag 6/4/21 0249)   magnesium sulfate IVPB premix 2 g (has no administration in time range)   calcium GLUConate 1 g in  mL IVPB (has no administration in time range)   potassium phosphate 15 mmol in dextrose 5% 250 mL ivpb (0 mmol Intravenous Stopped 6/2/21 1846)   K+ Scale: Goal of 4.5 (1 Each Intravenous Given 6/3/21 1500)   levETIRAcetam (Keppra) 500 mg in 100 mL NaCl IV premix (0 mg Intravenous Stopped 6/3/21 2010)   potassium chloride (KCL) ivpb 10 mEq (0 mEq Intravenous Stopped. (Insulin or Heparin) 6/2/21 0653)   lactated ringers infusion (BOLUS) (0 mL Intravenous Stopped 6/2/21 1951)   potassium chloride (KCL) ivpb 10 mEq (0 mEq Intravenous Stopped 6/2/21 1130)   calcium  CHLORIDE 1,000 mg in dextrose 5% 100 mL IVPB (0 mg Intravenous Stopped 6/2/21 1600)   potassium chloride (KCL) ivpb 10 mEq (0 mEq Intravenous Stopped 6/2/21 1851)   potassium chloride (KCL) ivpb 10 mEq (0 mEq Intravenous Stopped 6/3/21 0100)   potassium chloride (KCL) ivpb 10 mEq (0 mEq Intravenous Stopped 6/3/21 0555)   potassium chloride (KCL) ivpb 10 mEq (0 mEq Intravenous Stopped 6/3/21 0918)   potassium chloride (KCL) ivpb 10 mEq (0 mEq Intravenous Stopped 6/3/21 1817)       FINAL IMPRESSION  1. Emaciated (HCC)    2. Severe protein-calorie malnutrition (HCC)    3. Uremic encephalopathy    4. Rheumatoid arthritis, involving unspecified site, unspecified whether rheumatoid factor present (HCC)    5. ALIRIO (acute kidney injury) (HCC)    6. Hypokalemia    7. Hypernatremia    8. Electrolyte abnormality        -ADMIT-      Pertinent Labs & Imaging studies reviewed and verified by myself, as well as nursing notes and the patient's past medical, family, and social histories (See chart for details).    Portions of this record were made with voice recognition software.  Despite my review, spelling/grammar/context errors may still remain.  Interpretation of this chart should be taken in this context.    Electronically signed by Christopher Hager M.D. on 6/4/2021 at 4:24 AM.

## 2021-06-02 NOTE — ED NOTES
Break RN: Bedside report given via IPASS tool to CICU RN. All belongings with pt. All questions answered. No acute pt changes.

## 2021-06-02 NOTE — THERAPY
Missed Therapy     Patient Name: Kristel Ocampo  Age:  65 y.o., Sex:  female  Medical Record #: [id number]  Today's Date: 6/2/2021    Discussed missed therapy with RN     OT consult rec'd. Pt not responsive and pending POC/GOC. Will hold and re-attempt as appropriate/able once definitive POC established.

## 2021-06-02 NOTE — ASSESSMENT & PLAN NOTE
BUN in 99 with creatinine 1.0. At OSH creatinine was 1.3  Baseline creatinine is unknown but I suspect this is high for her given her low BMI  Improving

## 2021-06-02 NOTE — ASSESSMENT & PLAN NOTE
Extreme life threatening hypokalemia at 1.3  ECG with no u wave, no QTc prolongation.   Ongoing correction/replacement

## 2021-06-02 NOTE — ASSESSMENT & PLAN NOTE
Due to extreme intravascular depletion.   Ongoing IVF and free water  Goal to slowly correct sodium by 8-10 in next 24 hours
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
## 2021-06-02 NOTE — ASSESSMENT & PLAN NOTE
Since childhood with extensive treatment history, chronic pain and contracture with marked mobility limitation

## 2021-06-02 NOTE — ED NOTES
PTs automatic BP is not reading, however, the RI has a strong and regular femoral and radial pulse. ERP aware. PT appears to be perfusing. Cap refill is < 3sec

## 2021-06-02 NOTE — ASSESSMENT & PLAN NOTE
Aspiration risk  Holding enteral feeding, chynarak pulled out.  Daughter okay with patient eating and aware of risk of aspiration  Stop IV fluids  COMFORT CARE

## 2021-06-02 NOTE — ED NOTES
Med Rec partial per information from other facility.  Allergies updated  Unknown oral ABX use in the last 14 days     Unknown Pt's home pharmacy    Unable to reach Pt's daughter at this time.

## 2021-06-02 NOTE — CARE PLAN
Problem: Nutritional:  Goal: Nutrition support tolerated and meeting greater than 85% of estimated needs  Outcome: Not Met   See RD note

## 2021-06-03 PROBLEM — G40.909 SEIZURE DISORDER (HCC): Status: ACTIVE | Noted: 2021-01-01

## 2021-06-03 PROBLEM — D64.9 ANEMIA: Status: ACTIVE | Noted: 2021-01-01

## 2021-06-03 NOTE — THERAPY
Physical Therapy   Initial Evaluation     Patient Name: Kristel Ocampo  Age:  65 y.o., Sex:  female  Medical Record #: [id number]  Today's Date: 6/3/2021     Precautions: Fall Risk    Assessment  Patient is 65 y.o. female admitted for dehydration, malnutrition, ALIRIO and stress induced cardiomyopathy. Pt unable to provide meaningful history at this time, not oriented to location, month. Pt required Max A to complete mobility to EOB and partial stand at EOB. Pt is likely non-ambulatory at baseline due to current contractures and foot position, may be able to perform transfer to scooter which pt reports using for longer distance mobility. Per chart review, noted potential hospice referral. PT will follow while in house so long as it aligns with pt's GOC.     Plan    Recommend Physical Therapy 2 times per week until therapy goals are met for the following treatments:  Bed Mobility, Equipment, Gait Training, Self Care/Home Evaluation, Therapeutic Activities and Therapeutic Exercises    DC Equipment Recommendations: Unable to determine at this time  Discharge Recommendations: Recommend post-acute placement for additional physical therapy services prior to discharge home (if in alignment with GOC)       06/03/21 1243   Precautions   Precautions Fall Risk   Comments HX of JRA with contractures   Vitals   O2 Delivery Device Silicone Nasal Cannula   Prior Living Situation   Prior Services Unable To Determine At This Time   Housing / Facility 1 Story Apartment / Condo   Comments pt unable to provide much history.    Prior Level of Functional Mobility   Bed Mobility Unable To Determine At This Time   Comments pt reports she uses a scooter but is able to walk short distances. based on current contractures unclear how ambulatory pt is   Cognition    Speech/ Communication Delayed Responses   Level of Consciousness Alert   Comments cooperative, unreliable historian   Active ROM Lower Body    Active ROM Lower Body  X   Comments PEYTON DANIELSON  contractures due to RA   Strength Lower Body   Lower Body Strength  X   Comments grossly deconditioned    Balance Assessment   Sitting Balance (Static) Fair -   Sitting Balance (Dynamic) Poor   Standing Balance (Static) Trace   Standing Balance (Dynamic) Dependent   Weight Shift Sitting Poor   Weight Shift Standing Absent   Comments partial stand at EOB.    Gait Analysis   Gait Level Of Assist Unable to Participate   Bed Mobility    Supine to Sit Maximal Assist   Sit to Supine Maximal Assist   Scooting Maximal Assist   Functional Mobility   Sit to Stand Maximal Assist  (x2 people, partial stand)   Short Term Goals    Short Term Goal # 1 pt will perform supine <> sit with Min A in 6 visits   Short Term Goal # 2 pt will perform sit <> stand and functional transfers with Mod A in 6 visits   Problem List    Problems Impaired Bed Mobility;Impaired Transfers;Impaired Ambulation;Decreased Activity Tolerance;Functional Strength Deficit;Functional ROM Deficit   Anticipated Discharge Equipment and Recommendations   DC Equipment Recommendations Unable to determine at this time   Discharge Recommendations Recommend post-acute placement for additional physical therapy services prior to discharge home  (if in alignment with GOC)

## 2021-06-03 NOTE — PALLIATIVE CARE
Palliative Care follow-up  Discused plan for SNF with hospice with SW.    Called and left a msg for a return call with [facility].       Plan:   Discharge to SNF with hospice near [facility]         Thank you for allowing Palliative Care to participate in this patient's care. Please feel free to call [phone] with any questions or concerns.

## 2021-06-03 NOTE — PROGRESS NOTES
"Critical Care Progress Note    Date of admission  6/2/2021    Chief Complaint  65 y.o. female, PMH rheumatoid arthritis, admitted 6/2/2021 in transfer from outside facility for dehydration and abnormal labs, admitted to ICU with critical hypokalemia, 1.3 and sodium 170    Hospital Course  \"65 y.o. female BMI 13, LTAC resident who presented 6/2/2021 with severe dehydration and life threatening hypernatremia of 170 and hypokalemia of 1.3. Creatinine 1.03, BUN 99. WBC 17K  At ED, pt's vitals are stable. PT was given 2L fluid boluses at ED and followed with maintaince fluid NS at 100cc/hr. ECG with ST depressions, no u wave or prolonged QT. Troponin 152  Of note, upon review of OSH, creatinine 1.3, WBC 17. Primary team attempted to contact family, but no success. \"    6/3 - tolerating enteral nutrition, replacing electrolytes, more calm and cooperative but confused; now DNR/DNI and working on SNF with hospice    Interval Problem Update  Reviewed last 24 hour events:  T-max 98.8  VSS  Calm and cooperative, confused  Tolerating tube feed at 30 cc/hour (goal 40)  Correcting electrolytes with every 6 hours scale monitoring for refeeding syndrome  Sodium 170-162-158  Currently on LR 83      Review of Systems  Review of Systems   Unable to perform ROS: Acuity of condition        Vital Signs for last 24 hours   Temp:  [36.6 °C (97.9 °F)-37.1 °C (98.8 °F)] 36.7 °C (98.1 °F)  Pulse:  [57-80] 79  Resp:  [11-27] 17  BP: ()/(39-73) 115/52  SpO2:  [88 %-99 %] 91 %    Hemodynamic parameters for last 24 hours       Respiratory Information for the last 24 hours       Physical Exam   Physical Exam  Vitals and nursing note reviewed.   Constitutional:       General: She is not in acute distress.     Appearance: She is ill-appearing. She is not toxic-appearing.      Comments: cachectic   HENT:      Head: Normocephalic.      Mouth/Throat:      Mouth: Mucous membranes are dry.   Eyes:      Pupils: Pupils are equal, round, and reactive " to light.   Cardiovascular:      Rate and Rhythm: Normal rate.      Pulses: Normal pulses.      Heart sounds: Normal heart sounds. No murmur heard.     Pulmonary:      Effort: No respiratory distress.      Breath sounds: Normal breath sounds. No wheezing, rhonchi or rales.      Comments: Diminished at bases  Abdominal:      General: There is no distension.      Palpations: Abdomen is soft.      Tenderness: There is no abdominal tenderness. There is no guarding.   Musculoskeletal:      Right lower leg: No edema.      Left lower leg: No edema.      Comments: Extensive muscle atrophy in upper and lower ext bilat  Ulnar deviation of hands bilaterally   Skin:     Capillary Refill: Capillary refill takes 2 to 3 seconds.      Coloration: Skin is not jaundiced.      Findings: No bruising or rash.   Neurological:      General: No focal deficit present.      Mental Status: She is alert.      Comments: Follows commands but not oriented to place time or situation, intermittently agitated and yelling out  some contracture  ROM is limited         Medications  Current Facility-Administered Medications   Medication Dose Route Frequency Provider Last Rate Last Admin   • potassium chloride (KCL) ivpb 10 mEq  10 mEq Intravenous Q HOUR Juan Miguel Tapia M.D. 100 mL/hr at 06/03/21 0818 10 mEq at 06/03/21 0818   • enalaprilat (VASOTEC) injection 1.25 mg  1.25 mg Intravenous Q6HRS PRN Kye Pierre, D.O.       • labetalol (NORMODYNE/TRANDATE) injection 10 mg  10 mg Intravenous Q4HRS PRN Kye Pierre, D.O.       • ondansetron (ZOFRAN) syringe/vial injection 4 mg  4 mg Intravenous Q4HRS PRN Kye Pierre, D.O.       • heparin injection 5,000 Units  5,000 Units Subcutaneous Q12HRS Juan Miguel Tapia M.D.   5,000 Units at 06/03/21 0500   • Pharmacy Consult: Enteral tube insertion - review meds/change route/product selection  1 Each Other PHARMACY TO DOSE Juan Miguel Tapia M.D.       • senna-docusate (PERICOLACE or SENOKOT S) 8.6-50 MG  per tablet 2 tablet  2 tablet Enteral Tube BID Juan Miguel Tapia M.D.   2 tablet at 06/03/21 0500    And   • polyethylene glycol/lytes (MIRALAX) PACKET 1 Packet  1 Packet Enteral Tube QDAY PRN Juan Miguel Tapia M.D.        And   • magnesium hydroxide (MILK OF MAGNESIA) suspension 30 mL  30 mL Enteral Tube QDAY PRN Juan Miguel Tapia M.D.        And   • bisacodyl (DULCOLAX) suppository 10 mg  10 mg Rectal QDAY PRN Juan Miguel Tapia M.D.       • thiamine (Vitamin B-1) tablet 100 mg  100 mg Enteral Tube BID Juan Miguel Tapia M.D.   100 mg at 06/03/21 0500   • acetaminophen (Tylenol) tablet 650 mg  650 mg Enteral Tube Q6HRS PRN Juan Miguel Tapia M.D.       • ondansetron (ZOFRAN ODT) dispertab 4 mg  4 mg Enteral Tube Q4HRS PRN Juan Miguel Tapia M.D.       • oxyCODONE immediate-release (ROXICODONE) tablet 5 mg  5 mg Enteral Tube Q4HRS PRN Juan Miguel Tapia M.D.   5 mg at 06/03/21 0032   • haloperidol lactate (HALDOL) injection 1-2 mg  1-2 mg Intravenous Q4HRS PRN Juan Miguel Tapia M.D.   2 mg at 06/02/21 2238   • potassium phosphate IVPB 30 mmol in 500 mL D5W (premix)  30 mmol Intravenous Once Juan Miguel Tapia M.D.       • lactated ringers infusion   Intravenous Continuous Juan Miguel Tapia M.D. 83 mL/hr at 06/03/21 0232 New Bag at 06/03/21 0232   • K+ Scale: Goal of 4.5  1 Each Intravenous Q6HRS Juan Miguel Tapia M.D.   1 Each at 06/03/21 0600   • magnesium sulfate IVPB premix 2 g  2 g Intravenous Q8HRS PRN Juan Miguel Tapia M.D.       • calcium GLUConate 1 g in  mL IVPB  1 g Intravenous Q8HRS PRN Juan Miguel Tapia M.D.       • potassium phosphate 15 mmol in dextrose 5% 250 mL ivpb  15 mmol Intravenous Q8HRS PRN Juan Miguel Tapia M.D.   Stopped at 06/02/21 1846       Fluids    Intake/Output Summary (Last 24 hours) at 6/3/2021 0823  Last data filed at 6/3/2021 0813  Gross per 24 hour   Intake 5532.85 ml   Output 2285 ml   Net 3247.85 ml       Laboratory      Recent Labs     06/02/21  0028   CPKTOTAL 1566*     Recent Labs     06/02/21  1216  06/02/21  1216 06/02/21 1948 06/03/21 0128 06/03/21  0555   SODIUM 164*   < > 162* 160* 158*   POTASSIUM 2.2*   < > 3.0* 3.2* 3.5*   CHLORIDE 120*   < > 117* 118* 118*   CO2 37*   < > 36* 35* 35*   BUN 61*   < > 48* 42* 39*   CREATININE 0.76   < > 0.73 0.62 0.59   MAGNESIUM 2.9*  --  2.9* 2.5  --    PHOSPHORUS 2.1*  --  4.7* 2.6  --    CALCIUM 7.2*   < > 8.7 7.8* 8.3*    < > = values in this interval not displayed.     Recent Labs     06/02/21 0028 06/02/21  0530 06/02/21 1948 06/03/21 0128 06/03/21  0555   ALTSGPT 91*  --   --  78*  --    ASTSGOT 76*  --   --  75*  --    ALKPHOSPHAT 57  --   --  44  --    TBILIRUBIN 0.2  --   --  0.3  --    PREALBUMIN  --   --   --  12.1*  --    GLUCOSE 137*   < > 167* 131* 132*    < > = values in this interval not displayed.     Recent Labs     06/02/21 0028 06/03/21 0128   WBC 17.1* 11.1*   NEUTSPOLYS 87.70* 80.30*   LYMPHOCYTES 6.90* 10.80*   MONOCYTES 4.30 3.00   EOSINOPHILS 0.10 5.10   BASOPHILS 0.10 0.10   ASTSGOT 76* 75*   ALTSGPT 91* 78*   ALKPHOSPHAT 57 44   TBILIRUBIN 0.2 0.3     Recent Labs     06/02/21  0028 06/03/21 0128   RBC 4.30 3.51*   HEMOGLOBIN 12.8 10.5*   HEMATOCRIT 40.2 33.8*   PLATELETCT 241 174       Imaging  X-Ray:  I have personally reviewed the images and compared with prior images.    Assessment/Plan  * Severe protein-calorie malnutrition (HCC)- (present on admission)  Assessment & Plan  Severe malnutrition. Cachectic on exam with muscle waste  Multiple electrolyte imbalance   High risk for refeeding  Initiate enteral nutrition, high risk for refeeding syndrome  Serial phosphorus, magnesium, calcium, potassium with aggressive replacement, scheduled thiamine    Elevated troponin  Assessment & Plan  Pt denies chest pain. Troponin 152, stress-induced trending down    ALIRIO (acute kidney injury) (HCC)  Assessment & Plan  Likely due to prerenal and rhabdo  Resolved    Rheumatoid arthritis (HCC)- (present on admission)  Assessment & Plan  Since childhood  with extensive treatment history, chronic pain and contracture with marked mobility limitation    Hypokalemia- (present on admission)  Assessment & Plan  Extreme life threatening hypokalemia at 1.3  ECG with no u wave, no QTc prolongation.   Ongoing correction/replacement        Uremic encephalopathy- (present on admission)  Assessment & Plan  BUN in 99 with creatinine 1.0. At OSH creatinine was 1.3  Baseline creatinine is unknown but I suspect this is high for her given her low BMI  Improving    Azotemia- (present on admission)  Assessment & Plan  Improving    Hypernatremia- (present on admission)  Assessment & Plan  Due to extreme intravascular depletion.   Ongoing IVF and free water  Goal to slowly correct sodium by 8-10 in next 24 hours     Update:  Extensive discussion with patient's daughter Jaja yesterday and palliative care team.  Patient is now DNR/DNI and plan is for transfer to SNF with hospice near patient's home in [facility]    VTE:  Heparin  Ulcer: Not Indicated  Lines: None    I have performed a physical exam and reviewed and updated ROS and Plan today (6/3/2021). In review of yesterday's note (6/2/2021), there are no changes except as documented above.   Discussed patient condition and risk of morbidity and/or mortality with RN, RT, Pharmacy and QA team

## 2021-06-03 NOTE — DISCHARGE PLANNING
Care Transition Team Discharge Planning    Anticipated Discharge Disposition: d/c to SNF w/ hospice in [place] near dtr's home    Action: Lsw spoke to STANTON FryeYazmin w/ LTC facilities in [place].    She provided the contact fax and name for SNF referral:  [facility]  Fax [phone]    Yazmin will discuss referral w/ management. Yazmin's contact number from previous note listed on SNF choice as well.    Lsw sent text to DPA to advise of pending fax as above.    Barriers to Discharge: TBD    Plan: Lsw will continue to follow, and assist w/ d/c planning.

## 2021-06-03 NOTE — CARE PLAN
Problem: Skin Integrity  Goal: Skin integrity is maintained or improved  Outcome: Progressing  Note: The patients skin has been assessed. Patient is being turned Q2 hours, ad has mepilex on an in place. The patient has pillows in place used for support and positioning. Waffle mattress is in place.      Problem: Fall Risk  Goal: Patient will remain free from falls  Outcome: Progressing  Note: The patient is currently in bed with three bed rails raised and bed locked and in lowest position. The room is free and clear of all clutter, spill, and there are no assistive devices in sight. The patient has been educated on the call light, has it in reach but does not utilize it appropriately. Bed alarm on, will continue hourly rounding.

## 2021-06-03 NOTE — THERAPY
Occupational Therapy   Initial Evaluation     Patient Name: Kristel Ocampo  Age:  65 y.o., Sex:  female  Medical Record #: [id number]  Today's Date: 6/3/2021     Precautions  Precautions: Fall Risk  Comments: HX of JRA with contractures    Assessment  Patient is 65 y.o. female admitted for dehydration and hypernatremia found to have malnutrition, elevated troponins, ALIRIO and uremic encephalopathy. PMHx of RA with contractures. Pt is a questionable historian; reports lives with sister who assist 24/7 with all IADLs/ADLs/txfs and she txfs to w/c and is able to walk short distances; per chart, admitted from LTAC. Recommend clarification on PLOF, home setup, and assist available w/ family. Currently limited by decreased functional mobility, activity tolerance, cognition, strength, AROM, coordination, balance, and pain. Unclear if this is pt's baseline d/t poor cognition/poor history. Current plan per chart is to txf to SNF with hospice. Will continue to follow.     Plan    Recommend Occupational Therapy 2 times per week until therapy goals are met for the following treatments:  Adaptive Equipment, Self Care/Activities of Daily Living and Therapeutic Activities.    DC Equipment Recommendations: Unable to determine at this time  Discharge Recommendations: Recommend post-acute placement for additional occupational therapy      Objective     06/03/21 1221   Prior Living Situation   Prior Services Unable To Determine At This Time;Continuous (24 Hour) Care Giving Family   Housing / Facility 1 Story Apartment / Condo   Bathroom Set up Walk In Shower;Shower Chair  (unclear if accurate)   Equipment Owned Unable to Determine At This Time;Front-Wheel Walker;Wheelchair   Lives with - Patient's Self Care Capacity Other (Comments)   Comments Pt is a questionable historian. Reports lives with sister who assists with all IADLs/ADLs. Reports she can walk short distances. Recommend clarification with family   Prior Level of ADL Function    Self Feeding Requires Assist   Grooming / Hygiene Requires Assist   Bathing Requires Assist   Dressing Requires Assist   Toileting Requires Assist   Comments questionable   Prior Level of IADL Function   Medication Management Dependent   Laundry Dependent   Kitchen Mobility Dependent   Finances Dependent   Home Management Dependent   Shopping Dependent   Prior Level Of Mobility Uses Wheel Chair for in Home Mobility   Precautions   Precautions Fall Risk   Comments HX of JRA with contractures   Cognition    Cognition / Consciousness X   Speech/ Communication Delayed Responses  (soft spoken)   Level of Consciousness Alert   New Learning Impaired   Attention Impaired   Comments pleasant and cooperative. questionable historian. appears confused and not oriented to place, city, or month.    Passive ROM Upper Body   Passive ROM Upper Body X   Comments B hand, elbow, wrist and shoulder contractures.   Active ROM Upper Body   Active ROM Upper Body  X   Dominant Hand Right   Comments limited by contractures. minimal hand function   Strength Upper Body   Upper Body Strength  X   Gross Strength Generalized Weakness, Equal Bilaterally.    Comments limited by contractures, adequate  on R hand for light ADLs   Upper Body Muscle Tone   Upper Body Muscle Tone  X   Rt Upper Extremity Muscle Tone Rigidity;Contractures   Lt Upper Extremity Muscle Tone Gross Assist;Rigidity;Contractures   Comments in all BUE joints   Coordination Upper Body   Coordination X   Comments GM and FM in hand manipulation d/t contractures   Balance Assessment   Sitting Balance (Static) Fair -   Sitting Balance (Dynamic) Poor +   Standing Balance (Static) Trace   Standing Balance (Dynamic) Dependent   Weight Shift Sitting Poor   Weight Shift Standing Absent   Comments w/B HHA;unable to complete full stand   Bed Mobility    Supine to Sit Maximal Assist   Sit to Supine Maximal Assist   Scooting Maximal Assist   Comments HOB elevated; assists with BUEs req  assist with BLEs and torso d/t B knee contractures   ADL Assessment   Eating Moderate Assist   Grooming Maximal Assist  (wiping face with cloth)   Lower Body Dressing Total Assist   Toileting Total Assist  (zhu and bedpan)   Comments limited by cognition, fatigue and contractures   How much help from another person does the patient currently need...   Putting on and taking off regular lower body clothing? 2   Bathing (including washing, rinsing, and drying)? 1   Toileting, which includes using a toilet, bedpan, or urinal? 1   Putting on and taking off regular upper body clothing? 2   Taking care of personal grooming such as brushing teeth? 2   Eating meals? 2   6 Clicks Daily Activity Score 10   Functional Mobility   Sit to Stand Maximal Assist  (x2 ppl; x2 unable to complete full STS)   Mobility EOB and STS only   ICU Target Mobility Level   ICU Mobility - Targeted Level Level 2   Edema / Skin Assessment   Edema / Skin  Not Assessed   Activity Tolerance   Sitting Edge of Bed 12 min total   Standing < 10secs   Patient / Family Goals   Patient / Family Goal #1 to go home   Short Term Goals   Short Term Goal # 1 light g/h with min A   Short Term Goal # 2 eating with min A

## 2021-06-03 NOTE — DISCHARGE PLANNING
Agency/Facility Name: [facility]  Outcome: Left voice message for Yazmin regarding patient referral. Requested a call back.

## 2021-06-03 NOTE — DISCHARGE PLANNING
Received Choice form at 1205  Agency/Facility Name: [facility]  Referral sent per Choice form @ 1215 via manual fax to [phone]    Received Choice form at 0102  Agency/Facility Name: [facility]  Unable to send hospice referral, currently no hospice order.       @1345, hospice referral sent via manual fax to [facility]

## 2021-06-03 NOTE — PALLIATIVE CARE
Palliative Care follow-up  Received a call back from Winifred with [facility]. She provided fax number for Hospice referral.    Faxed hospice choice to MARIA TERESA.     She suggested calling Riri Kennedy, the admission coordinator for the SNF long term care for bed availability. Provided info to Kristel EID to begin the SNF referral.     Active listening, education, validation, normalization, therapeutic touch, and emotional support provided.     Updated:   [facility] team    Plan:   Discharge to SNF with hospice once arranged.        Thank you for allowing Palliative Care to participate in this patient's care. Please feel free to call [phone] with any questions or concerns.
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
## 2021-06-03 NOTE — DISCHARGE PLANNING
Care Transition Team Discharge Planning    Anticipated Discharge Disposition: d/c to SNF w/ hospice    Action:  LSw spoke to palliative RN. She will call and update pt's dtr. She is asking for updates regarding the referrals.    Lsw f/u w/ DPA. Once orders were entered, referrals were faxed.    DPa will call SNF now to acquire an update.     Barriers to Discharge: TBD    Plan: Lsw will continue to follow, and assist w/ d/c planing

## 2021-06-03 NOTE — PROGRESS NOTES
Critical care    Appreciate palliative care and case management input.  Patient is now DNR/DNI and plan is for transfer to SNF with hospice near patient's home in [facility].  Case discussed with hospitalist team.  Critical care will sign off.  Please call if needed.

## 2021-06-03 NOTE — PROGRESS NOTES
Hospital Medicine Daily Progress Note    Date of Service  6/3/2021    Chief Complaint   Altered mental state    Hospital Course  65 y.o. female with a history of dementia,seizure disorder, and rheumatoid arthritis admitted 6/2/2021 with acute worsening of mentation on her baseline dementia with emaciation, uremic encephalopathy, severe hypernatremia, severe hypokalemia    Interval Problem Update  6/3: Weak, confused.  Appears very frail. Discussed with Dr Tapia and , planning for Hospice and SNF near patient's home town. I have ordered for SNF and Hospice.    Consultants/Specialty  Intensivist    Code Status  DNAR/DNI    Disposition  Transfer to [facility].  Goal for Hospice and SNF in [place]    Review of Systems  Review of Systems   Unable to perform ROS: Dementia        Physical Exam  Temp:  [36.6 °C (97.9 °F)-37 °C (98.6 °F)] 36.8 °C (98.2 °F)  Pulse:  [57-87] 81  Resp:  [13-23] 16  BP: ()/(45-68) 112/61  SpO2:  [88 %-99 %] 96 %    Physical Exam  Constitutional:       Appearance: She is cachectic. She is ill-appearing.      Interventions: She is restrained.   HENT:      Head: Normocephalic and atraumatic.      Mouth/Throat:      Mouth: Mucous membranes are dry.   Eyes:      General:         Right eye: No discharge.         Left eye: No discharge.      Conjunctiva/sclera: Conjunctivae normal.   Cardiovascular:      Rate and Rhythm: Normal rate and regular rhythm.      Pulses:           Radial pulses are 2+ on the right side and 2+ on the left side.        Dorsalis pedis pulses are 1+ on the right side and 1+ on the left side.      Heart sounds: No murmur heard.     Pulmonary:      Effort: Pulmonary effort is normal. No respiratory distress.      Breath sounds: Normal breath sounds. No wheezing.   Abdominal:      General: Bowel sounds are normal. There is no distension.      Tenderness: There is no abdominal tenderness.      Comments: Thin   Musculoskeletal:         General: No swelling.       Cervical back: No tenderness.      Right lower leg: No edema.      Left lower leg: No edema.      Comments: Atrophy of musculature   Lymphadenopathy:      Cervical: No cervical adenopathy.   Skin:     General: Skin is dry.      Coloration: Skin is not jaundiced.      Comments: Thin skin   Neurological:      Mental Status: She is lethargic and disoriented.      Coordination: Coordination abnormal.   Psychiatric:         Speech: Speech is tangential.         Cognition and Memory: Cognition is impaired.         Fluids    Intake/Output Summary (Last 24 hours) at 6/3/2021 2001  Last data filed at 6/3/2021 1800  Gross per 24 hour   Intake 3372.7 ml   Output 2713 ml   Net 659.7 ml       Laboratory  Recent Labs     06/02/21  0028 06/03/21  0128   WBC 17.1* 11.1*   RBC 4.30 3.51*   HEMOGLOBIN 12.8 10.5*   HEMATOCRIT 40.2 33.8*   MCV 93.5 96.3   MCH 29.8 29.9   MCHC 31.8* 31.1*   RDW 56.6* 59.2*   PLATELETCT 241 174   MPV 12.2 12.6     Recent Labs     06/03/21  0128 06/03/21  0555 06/03/21  1445   SODIUM 160* 158* 161*   POTASSIUM 3.2* 3.5* 3.9   CHLORIDE 118* 118* 117*   CO2 35* 35* 37*   GLUCOSE 131* 132* 154*   BUN 42* 39* 28*   CREATININE 0.62 0.59 0.48*   CALCIUM 7.8* 8.3* 8.2*                   Imaging  EC-ECHOCARDIOGRAM COMPLETE W/O CONT   Final Result      IR-MIDLINE CATHETER INSERTION WO GUIDANCE > AGE 3   Final Result                  Ultrasound-guided midline placement performed by qualified nursing staff    as above.          DX-CHEST-PORTABLE (1 VIEW)   Final Result      No acute cardiopulmonary abnormality.      DX-ABDOMEN FOR TUBE PLACEMENT   Final Result      Enteric feeding tube tip projects over the expected location of the proximal stomach.      CT-HEAD W/O   Final Result      1. No CT evidence of acute infarct, hemorrhage or mass.   2. Mild to moderate global parenchymal atrophy. Chronic small vessel ischemic changes.           Assessment/Plan  * Severe protein-calorie malnutrition (HCC)- (present on  admission)  Assessment & Plan  Aspiration risk  Holding enteral feeding, cortrak pulled out.  IV fluids    Seizure disorder ([facility])  Assessment & Plan  Keppra changed to oral as loss of enteral access.  Seizure precautions.    Rheumatoid arthritis (HCC)- (present on admission)  Assessment & Plan  Pain management    Nausea- (present on admission)  Assessment & Plan  6/3 no vomiting and nausea appears controlled.  Prn Antiemetics ordered for nausea  Likely related to her level of uremia    Hypokalemia- (present on admission)  Assessment & Plan  6/3 1445 K:3.9  Replete and monitor    Uremic encephalopathy- (present on admission)  Assessment & Plan  Stopping frequent lab draws and will look every 12 hours for now.  IV fluids.    Azotemia- (present on admission)  Assessment & Plan  Likely prerenal azotemia from dehydration  fluids IV  BMP   6/3 1445 BUN:28, Cr:0.48    Hypernatremia- (present on admission)  Assessment & Plan  IV fluids  6/3 1445 Na:161    Emaciated ([facility])- (present on admission)  Assessment & Plan  Hospice ordered with plan for SNF near patient's home  IV fluids  Cortralk pulled.  Aspiration risk       VTE prophylaxis: Heparin

## 2021-06-03 NOTE — CARE PLAN
Problem: Nutritional:  Goal: Nutrition support tolerated and meeting greater than 85% of estimated needs  Outcome: Met

## 2021-06-03 NOTE — PALLIATIVE CARE
Palliative Care follow-up  Called daughter, discussed updates on awaiting responses back from SNF and hospice agency. Jaja reports she boarded her dogs and is planning to drive down today to be at bedside. Discussed that discharge date/time is dependent on bed availability at the SNF in either [place] or [place].         Plan:   Awaiting return calls from SNF and hospice agency.         Thank you for allowing Palliative Care to participate in this patient's care. Please feel free to call [phone] with any questions or concerns.

## 2021-06-03 NOTE — DISCHARGE PLANNING
Care Transition Team Discharge Planning    Anticipated Discharge Disposition: d/c to SNF bed on hospice    Action: Lsw spoke to palliative RN, Domonique. She is assisting pt and dtr to d/c back home to [place], [place] and a LTC bed at SNF on hospice.    Later RN reports the chosen hospice agency is [facility]. The choice has been faxed.    Gilma called [facility] @ [phone] to acquire names of two SNFs to provide referrals to: one located in [place], [place] and a second in [place], [place].    Lsw left  w/ Yazmin requesting as above.    Barriers to Discharge: TBD    Plan: Lsw will continue to follow, and assist w/ d/c planning.

## 2021-06-04 NOTE — CARE PLAN
Problem: Skin Integrity  Goal: Skin integrity is maintained or improved  Outcome: Progressing  Note: Appropriate skin interventions  in place, wound team following     Problem: Fall Risk  Goal: Patient will remain free from falls  Outcome: Progressing  Note: High fall risk interventions in place as per the HD fall risk assessment   The patient is Stable - Low risk of patient condition declining or worsening         Progress made toward(s) clinical / shift goals:      Patient is not progressing towards the following goals:      Problem: Knowledge Deficit - Standard  Goal: Patient and family/care givers will demonstrate understanding of plan of care, disease process/condition, diagnostic tests and medications  Outcome: Not Progressing  Note: Continue to reinforce POC, dementia at BL, frequently reorienting patient to environment

## 2021-06-04 NOTE — PALLIATIVE CARE
"Palliative Care follow-up  PC RN visited pt and dtr Jaja at bedside, Jaja asked to speak privately as she doesn't want to upset pt.    Took pt to a private room, Jaja states pt is asking to eat her Premier protein shake. Discussed pending SLP evaluation regarding safe oral intake, concern for aspiration risk and pt's current POC. Jaaj states \"She's asking to eat. If she chokes then she chokes\". Jaja also states pt is asking for the restraints to be removed.    Discussed comfort focused care, answered questions. Jaja verbalized understanding, she wants pt to drink the premier protein shakes she likes. Educated Jaja on aspiration risk and the goal of comfort measures only is to ensure pt is not suffering. Jaja verbalized understanding.    Discussed delay on admissions to SNF and updates from . Jaja inquired if pt would remain in the hospital at least till Monday, PC RN explained that per  notes the admissions team would look for admission on Monday, this could result in an admission any time next week.     Discussed POLST form, completed POLST for DNR, comfort measures only and no feeding tubes. Will scan a copy into EMR. Original POLST in plastic sleeve and tapped to wall at head of bed. ACP documents should be sent with pt upon unit transfer and/or discharge.     Active listening, education, validation, normalization, therapeutic touch, and emotional support provided.     Updated:   Dr. Hair, bedside RN, SLP    Plan:   Comfort focused care now.       Thank you for allowing Palliative Care to participate in this patient's care. Please feel free to call [phone] with any questions or concerns.  "

## 2021-06-04 NOTE — PROGRESS NOTES
Hospital Medicine Daily Progress Note    Date of Service  6/4/2021    Chief Complaint   Altered mental state    Hospital Course  65 y.o. female with a history of dementia,seizure disorder, and rheumatoid arthritis admitted 6/2/2021 with acute worsening of mentation on her baseline dementia with emaciation, uremic encephalopathy, severe hypernatremia, severe hypokalemia    Interval Problem Update  6/3: Weak, confused.  Appears very frail. Discussed with Dr Tapia and , planning for Hospice and SNF near patient's home town. I have ordered for SNF and Hospice.    6/4: Pleasantly confused.  Daughter at bedside.  Care discussed on Hospice and SNF plans.  Later in day Palliative met with daughter and daughter states she wants her mother to be able to eat is she wants to as it seems to be her focus of thought.  Daughter was aware that if she aspirates she could suddenly decline and die.  Daughter was onboard for COMFORT Measures.  No enteral tube feeding.    Consultants/Specialty  Intensivist    Code Status  Comfort Care/DNR    Disposition  Transfer to [facility].  Goal for Hospice and SNF in [place]    Review of Systems  Review of Systems   Unable to perform ROS: Dementia        Physical Exam  Temp:  [36.7 °C (98 °F)-37 °C (98.6 °F)] 36.8 °C (98.2 °F)  Pulse:  [73-95] 73  Resp:  [14-19] 16  BP: (112-152)/(55-83) 140/55  SpO2:  [90 %-98 %] 95 %    Physical Exam  Constitutional:       Appearance: She is cachectic. She is ill-appearing.      Interventions: She is restrained.   HENT:      Head: Normocephalic and atraumatic.      Mouth/Throat:      Mouth: Mucous membranes are dry.   Eyes:      General:         Right eye: No discharge.         Left eye: No discharge.      Conjunctiva/sclera: Conjunctivae normal.   Cardiovascular:      Rate and Rhythm: Normal rate and regular rhythm.      Pulses:           Radial pulses are 2+ on the right side and 2+ on the left side.        Dorsalis pedis pulses are 1+ on the  right side and 1+ on the left side.      Heart sounds: No murmur heard.     Pulmonary:      Effort: Pulmonary effort is normal. No respiratory distress.      Breath sounds: Normal breath sounds. No wheezing.   Abdominal:      General: Bowel sounds are normal. There is no distension.      Tenderness: There is no abdominal tenderness.      Comments: Thin   Musculoskeletal:         General: No swelling.      Cervical back: No tenderness.      Right lower leg: No edema.      Left lower leg: No edema.      Comments: Atrophy of musculature   Skin:     General: Skin is dry.      Comments: Thin skin   Neurological:      Mental Status: She is lethargic and disoriented.      Coordination: Coordination abnormal.   Psychiatric:         Speech: Speech is tangential.         Cognition and Memory: Cognition is impaired.         Fluids    Intake/Output Summary (Last 24 hours) at 6/4/2021 1739  Last data filed at 6/4/2021 1600  Gross per 24 hour   Intake 2764.84 ml   Output 4151 ml   Net -1386.16 ml       Laboratory  Recent Labs     06/02/21  0028 06/03/21  0128   WBC 17.1* 11.1*   RBC 4.30 3.51*   HEMOGLOBIN 12.8 10.5*   HEMATOCRIT 40.2 33.8*   MCV 93.5 96.3   MCH 29.8 29.9   MCHC 31.8* 31.1*   RDW 56.6* 59.2*   PLATELETCT 241 174   MPV 12.2 12.6     Recent Labs     06/03/21  1445 06/04/21  0312 06/04/21  1335   SODIUM 161* 162* 158*   POTASSIUM 3.9 3.1* 4.1   CHLORIDE 117* 115* 111   CO2 37* 38* 40*   GLUCOSE 154* 108* 107*   BUN 28* 19 14   CREATININE 0.48* 0.41* 0.38*   CALCIUM 8.2* 8.5 8.1*                   Imaging  EC-ECHOCARDIOGRAM COMPLETE W/O CONT   Final Result      IR-MIDLINE CATHETER INSERTION WO GUIDANCE > AGE 3   Final Result                  Ultrasound-guided midline placement performed by qualified nursing staff    as above.          DX-CHEST-PORTABLE (1 VIEW)   Final Result      No acute cardiopulmonary abnormality.      DX-ABDOMEN FOR TUBE PLACEMENT   Final Result      Enteric feeding tube tip projects over the  expected location of the proximal stomach.      CT-HEAD W/O   Final Result      1. No CT evidence of acute infarct, hemorrhage or mass.   2. Mild to moderate global parenchymal atrophy. Chronic small vessel ischemic changes.           Assessment/Plan  * Severe protein-calorie malnutrition (HCC)- (present on admission)  Assessment & Plan  Aspiration risk  Holding enteral feeding, cortrak pulled out.  Daughter okay with patient eating and aware of risk of aspiration  Stop IV fluids  COMFORT CARE    Seizure disorder ([facility])  Assessment & Plan  Keppra changed to oral as loss of enteral access.  Seizure precautions.    Rheumatoid arthritis ([facility])- (present on admission)  Assessment & Plan  Pain management    Nausea- (present on admission)  Assessment & Plan  6/3 no vomiting and nausea appears controlled.  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  IV fluids.    Azotemia- (present on admission)  Assessment & Plan  Likely prerenal azotemia from dehydration  6/3 1445 BUN:28, Cr:0.48  6/4 BUN:19    Hypernatremia- (present on admission)  Assessment & Plan  S/p IV fluids  6/4 Na:162  6/3 1445 Na:161    Emaciated ([facility])- (present on admission)  Assessment & Plan  Hospice ordered with plan for SNF near patient's home  Comfort care initiated so patient could eat per daughter's wish and aware of risk/plan 6/4  Aspiration risk       VTE prophylaxis: Heparin

## 2021-06-04 NOTE — CARE PLAN
Problem: Nutritional:  Goal: Achieve adequate nutritional intake  Description: Patient on diet per SLP and will consume ~50% of meals and supplements.   6/4/2021 1059 by Georgiana Mckeon R.D.  Outcome: Not Met     Pt has transitioned to Comfort Care.  PO diet in place with Boost supplements TID.  RD no longer following.

## 2021-06-04 NOTE — DOCUMENTATION QUERY
[facility]                                                                       Query Response Note      PATIENT:               JANAY SANTOS  ACCT #:                  [id number]  MRN:                     [id number]  :                      1955  ADMIT DATE:       2021 12:07 AM  DISCH DATE:          RESPONDING  PROVIDER #:        [id number]           QUERY TEXT:    Uremic Encephalopathy is documented in the progress notes.  Considering the clinical indicators, can the type of encephalopathy be further specified?     NOTE:  If an appropriate response is not listed below, please respond with a new note.    The patient's Clinical Indicators include:  NOTED  ALOC/Confused  Severe Dehydration  BUN in 99 with creatine 1.0. At OSH creatinine was 1.3  ALIRIO - likely due to prerenal and rhabdo  Hypernatremia/Hypokalemia  IVF bolus - BMP Q 12 hrs  Severe malnutrition - multiple electrolyte imbalance       Thank you,  Ritika Doshi RN, CCS  Clinical Documentation Integrity  Connect via Voalte  Options provided:   -- Toxic Metabolic Encephalopathy   -- Metabolic Encephalopathy   -- Not further specified   -- Unable to determine      Query created by: Ritika Doshi on 6/3/2021 2:50 PM    RESPONSE TEXT:    Metabolic Encephalopathy          Electronically signed by:  HUNTER ALEMAN MD 2021 3:16 PM

## 2021-06-04 NOTE — THERAPY
Missed Therapy     Patient Name: Kristel Ocampo  Age:  65 y.o., Sex:  female  Medical Record #: [id number]  Today's Date: 6/4/2021 06/04/21 1427   Treatment Variance   Reason For Missed Therapy Medical - Other (Please Comment)  (Per palliative care, pt transitioning to comfort measures)   Interdisciplinary Plan of Care Collaboration   IDT Collaboration with  Nursing  (Palliative Care)   Collaboration Comments SLP orders received, chart reviewed. Per discussion with RN and palliative care, pt will be transitioning to comfort care. No need for SLP evaluation. Please re-consult should SLP team be of assistance.

## 2021-06-04 NOTE — DIETARY
Nutrition Services: Day 2 of admit.  Kristel Ocampo is a 65 y.o. female with admitting DX of Hypernatremia  Cortrak removed by pt. Tube will not be replaced per MD D/w pt's daughter. SLP evalution pending for safest PO diet.  Poor appetite/decreased PO intake reported on admit malnutrition screen, and BMI <19. Therefore, would advise addition of nutrition supplements once diet order has been determined.     RD will continue to monitor.

## 2021-06-04 NOTE — DISCHARGE PLANNING
Care Transition Team Discharge Planning    Anticipated Discharge Disposition: d/c to ONC and place on comfort care    Action: Lsw spoke to palliative RN who updated as above.    Barriers to Discharge: TBD    Plan: Lsw will continue to follow, and assist w/ d/c planning.

## 2021-06-04 NOTE — DISCHARGE PLANNING
Agency/Facility Name: [facility]  Outcome: Left voice message for Yazmin regarding patient referral. Requested a call back.     Agency/Facility Name: [facility]  Outcome: Left voice message for Winifred (ext [phone]) regarding patient referral. Requested a call back.     @ 1225  MARIA TERESA spoke to Cristina @  [facility] ph: [phone] ext [phone]. Cristina reported she received hospice referral, however she spoke with Yazmin with [facility] and reported Yazmin is not in today and will not be back until Monday.    RAMIN Humphries notified

## 2021-06-04 NOTE — PROGRESS NOTES
Notified Dr. Hair that Cortrak self removed by patient while patient being cleaned.  Per patient's daughter she does not want the feeding tube reinserted unless it is the only way for patient to receive seizure medication.  Notified Dr. Hair of daughter's request and that there are no seizure medications on patient's MAR. Per Dr. Hair Keppra IV 500mg BID ordered and feeding tube not re-inserted. Daughter notified of Dr. Hair orders and that ST should be seeing patient tomorrow to reevaluate.

## 2021-06-04 NOTE — CARE PLAN
Problem: Nutritional:  Goal: Achieve adequate nutritional intake  Description: Patient on diet per SLP and will consume ~50% of meals and supplements.   Outcome: Not Met

## 2021-06-04 NOTE — DISCHARGE PLANNING
Care Transition Team Discharge Planning    Anticipated Discharge Disposition: d/c to SNF w/ hospice    Action: Lsw received questions from BS RN, palliative RN, and MD regarding need to f/u w/ referrals sent yesterday. Pt's dtr is at bedside and requesting updates.    LSw spoke to DPa throughout [facility]. She called and left VMs for both entetiets  [facility] & [facility]    LSw called Yazmin May w/ LTC and left VM updating as above and requesting update regarding referrals sent yesterday.    Lsw contacted palliative RN Domonique to acquire contact phone number for hospice.     Later LSw received update from DPA. Hospice agency indicates they will speak w/ SNF admissions once she, Yazmin, is back in the office on Monday. The referrals are on hold until Monday.    Lsw met w/ pt and dtr at bedside to update as above.    Dtr reports pt has not eaten for 24 hours as she needs a swallow eval 1st.    Pt repeatedly questioning why she is unable to eat.    Lsw sent text to medical team and called SLP office. Lsw left VM w/ SLP.    Charge indicates SLP will be here at 2pm. RD indicates plan is in place once diet orders are completed.    Lsw updated dtr at bedside as above.        Barriers to Discharge: TBD    Plan: LSw will continue to follow, and assist w/ d/c planning.

## 2021-06-05 PROBLEM — Z51.5 COMFORT MEASURES ONLY STATUS: Status: ACTIVE | Noted: 2021-01-01

## 2021-06-05 NOTE — PROGRESS NOTES
Hospital Medicine Daily Progress Note    Date of Service  6/5/2021    Chief Complaint  AMS    Hospital Course  65 y.o. female with a history of dementia,seizure disorder, and rheumatoid arthritis admitted 6/2/2021 with acute worsening of mentation on her baseline dementia with emaciation, uremic encephalopathy, severe hypernatremia, severe hypokalemia    Interval Problem Update  6/5- Patient transferred to Oncology unit on comfort care, awaiting hospice and placement. Patient resting and appears comfortable. No acute needs. Will continue to work with family and CM to place on hospice.     Consultants/Specialty  Intensivist    Code Status  Comfort Care/DNR    Disposition    Goal for Hospice and SNF in [place]    Review of Systems  Review of Systems   Unable to perform ROS: Dementia        Physical Exam  Temp:  [36.7 °C (98 °F)-37.5 °C (99.5 °F)] 37.5 °C (99.5 °F)  Pulse:  [] 107  Resp:  [14] 14  BP: (115-140)/(55-76) 115/70  SpO2:  [93 %-99 %] 93 %    Physical Exam  Vitals (Formal exam deferred as patient is on Comfort Care status and resting comfortably) and nursing note reviewed.   Constitutional:       General: She is sleeping.      Appearance: She is cachectic. She is ill-appearing.   HENT:      Head: Normocephalic.   Pulmonary:      Effort: Pulmonary effort is normal. No respiratory distress.   Abdominal:      Comments: Thin   Musculoskeletal:         General: No swelling.      Cervical back: No tenderness.         Fluids    Intake/Output Summary (Last 24 hours) at 6/5/2021 1107  Last data filed at 6/5/2021 0606  Gross per 24 hour   Intake 420.85 ml   Output 3650 ml   Net -3229.15 ml       Laboratory  Recent Labs     06/03/21  0128   WBC 11.1*   RBC 3.51*   HEMOGLOBIN 10.5*   HEMATOCRIT 33.8*   MCV 96.3   MCH 29.9   MCHC 31.1*   RDW 59.2*   PLATELETCT 174   MPV 12.6     Recent Labs     06/03/21  1445 06/04/21  0312 06/04/21  1335   SODIUM 161* 162* 158*   POTASSIUM 3.9 3.1* 4.1   CHLORIDE 117* 115* 111    CO2 37* 38* 40*   GLUCOSE 154* 108* 107*   BUN 28* 19 14   CREATININE 0.48* 0.41* 0.38*   CALCIUM 8.2* 8.5 8.1*                   Imaging  EC-ECHOCARDIOGRAM COMPLETE W/O CONT   Final Result      IR-MIDLINE CATHETER INSERTION WO GUIDANCE > AGE 3   Final Result                  Ultrasound-guided midline placement performed by qualified nursing staff    as above.          DX-CHEST-PORTABLE (1 VIEW)   Final Result      No acute cardiopulmonary abnormality.      DX-ABDOMEN FOR TUBE PLACEMENT   Final Result      Enteric feeding tube tip projects over the expected location of the proximal stomach.      CT-HEAD W/O   Final Result      1. No CT evidence of acute infarct, hemorrhage or mass.   2. Mild to moderate global parenchymal atrophy. Chronic small vessel ischemic changes.           Assessment/Plan  * Comfort measures only status  Assessment & Plan  Pursuing Hospice and placement  Comfort only measures     Seizure disorder (HCC)  Assessment & Plan  Keppra changed to oral as loss of enteral access.  Seizure precautions.  Continue for comfort     Rheumatoid arthritis (HCC)- (present on admission)  Assessment & Plan  Pain management    Nausea- (present on admission)  Assessment & Plan  6/3 no vomiting and nausea appears controlled.  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  IV fluids.    Azotemia- (present on admission)  Assessment & Plan  Comfort care     Hypernatremia- (present on admission)  Assessment & Plan  Comfort care no further lab work     Severe protein-calorie malnutrition (HCC)- (present on admission)  Assessment & Plan  Aspiration risk  Holding enteral feeding, cortrak pulled out.  Daughter okay with patient eating and aware of risk of aspiration  Stop IV  fluids  COMFORT CARE    Emaciated (HCC)- (present on admission)  Assessment & Plan  Hospice ordered with plan for SNF near patient's home  Comfort care initiated so patient could eat per daughter's wish and aware of risk/plan 6/4         VTE prophylaxis: Comfort care

## 2021-06-05 NOTE — PROGRESS NOTES
4 Eyes Skin Assessment Completed by Candelaria UNGER RN and Ольга MARTIN RN.    Head WDL  Ears WDL  Nose WDL  Mouth WDL  Neck WDL  Breast/Chest WDL  Shoulder Blades Non-Blanching  Spine WDL  (R) Arm/Elbow/Hand WDL  (L) Arm/Elbow/Hand WDL  Abdomen WDL  Groin WDL  Scrotum/Coccyx/Buttocks Redness, Blanching and Excoriation  (R) Leg WDL  (L) Leg WDL  (R) Heel/Foot/Toe WDL  (L) Heel/Foot/Toe WDL          Devices In Places Nasal Cannula      Interventions In Place Gray Ear Foams, Heel Mepilex, Waffle Overlay, Q2 Turns, Barrier Cream, Dri-Luís Pads and Pressure Redistribution Mattress    Possible Skin Injury Yes    Pictures Uploaded Into Epic N/A (CC)  Wound Consult Placed N/A (CC)  RN Wound Prevention Protocol Ordered Yes

## 2021-06-05 NOTE — PALLIATIVE CARE
Palliative Care follow-up  Discussed case with bedside RN. Pt appears comfortable, resting in bed. She is awake but does not engage in discussion. No family at bedside.        Plan:   Awaiting admissions eval for Monday for admission to SNF with hospice.       Thank you for allowing Palliative Care to participate in this patient's care. Please feel free to call [phone] with any questions or concerns.

## 2021-06-05 NOTE — CARE PLAN
Problem: Pain - Standard  Goal: Alleviation of pain or a reduction in pain to the patient’s comfort goal  Outcome: Progressing     Problem: Skin Integrity  Goal: Skin integrity is maintained or improved  Outcome: Progressing      The patient is Stable - Low risk of patient condition declining or worsening         Progress made toward(s) clinical / shift goals:  Pt resting comfortably with signs of distress.    Patient is not progressing towards the following goals:

## 2021-06-05 NOTE — DISCHARGE PLANNING
Anticipated Discharge Disposition: Support daughter due to death of pt.     Action:  This writer met with pt's daughter Dora Ocampo [phone] who is 35 years old and was the pt's caregiver. Dora reports she has two friends [place] and [place] she can call for support. Dora [facility] and was given a in this difficult time paperwork and discussed the next steps. Dora reports she is now unemployed as she was being paid to be her mother's caregiver. This writer encouraged Dora to talk to the agency and file for unemployment benefits.  Dora reports she is able to drive home and call the mortuary at a later time and will call their mortgage company and notified them of the death. Dora given [facility] assistance information.    Barriers to Discharge: N/A    Plan: Dora reports she will drive home today back to [place] and call her friends if needed.

## 2021-06-05 NOTE — PROGRESS NOTES
Pt passed away at 15:35 pm. 2 RN's pronounced. Daughter Jaaj by the bedside. MD, , Donor bank notified. Pt not  case. Mortuary not chosen.

## 2021-06-06 NOTE — DISCHARGE SUMMARY
Death Summary    Cause of Death  Cardiopulmonary arrest due to metobolic encephalopathy due to hypernatremia due to dehydration and severe protein calorie malnutrition .    Comorbid Conditions at the Time of Death  Principal Problem:    Comfort measures only status POA: Unknown  Active Problems:    Emaciated (HCC) POA: Yes    Severe protein-calorie malnutrition (HCC) POA: Yes    Hypernatremia POA: Yes    Azotemia POA: Yes    Uremic encephalopathy POA: Yes    Hypokalemia POA: Yes    Nausea POA: Yes    Rheumatoid arthritis (HCC) POA: Yes    ALIRIO (acute kidney injury) (HCC) POA: Unknown    Elevated troponin POA: Unknown    Anemia POA: Unknown      Overview: Monitor      Likely of chronic disease      6/3 Hgb 10.5 after IV fluids      No acute sign of blood loss    Seizure disorder (HCC) POA: Unknown  Resolved Problems:    * No resolved hospital problems. *      History of Presenting Illness and Hospital Course  This is a 65 y.o. female admitted 2021 with known history of rheumatoid arthritis, dementia and failure to thrive.  She presented to the emergency room as a transfer with severe dehydration and life-threatening hypernatremia of 170 with hypokalemia of 1.3.  Patient was transferred from outside facility after work-up there revealed severe dehydration.  Patient was a resident of a long-term care facility and stated she was not being provided water for unknown reasons.  Patient was confused on admission and unsure why she was in the hospital.  Patient was cachectic with noted muscle wasting and atrophy.  Patient was admitted to the ICU.  IV fluid boluses were administered.  Electrolyte replacement therapy and management was initiated.  Palliative care met with family and had an extensive discussion with patient's daughter.  After discussion patient was transitioned to comfort care status and allowed to pass naturally.  Patient  on 2021 at 1535 and was pronounced by 2 RNs.    Death Date: 21    Death Time: 1535         Pronounced By (RN1): Yaquelin Paulino  Pronounced By (RN2): Stefanie Leahy

## 2021-06-08 NOTE — DOCUMENTATION QUERY
[facility]                                                                       Query Response Note      PATIENT:               JANAY SANTOS  ACCT #:                  [id number]  MRN:                     [id number]  :                      1955  ADMIT DATE:       2021 12:07 AM  DISCH DATE:        2021 3:35 PM  RESPONDING  PROVIDER #:        [id number]           QUERY TEXT:    On 6/3/21, the wound care RN  noted the following pressure injuries on the Wound and Skin Care Evaluation, however, pressure injuries are not mentioned in your documentation. Please clarify if you agree or disagree with the following  RN's assessment of pressure injuries.    -Wound 21 Pressure Injury Heel Plantar Right - POA - Unstageable  -Wound 21 Pressure Injury Ankle Lateral Left - POA - Stage 1    NOTE: If an appropriate response is not listed below, please respond with a new note.      The patient's Clinical Indicators include:  NOTED:    6/3/21- Wound and Skin Evaluation  Pressure Injury Heel Plantar Right - POA - Unstageable  Pressure Injury Ankle Lateral Left - POA - stage 1  Wound care evaluation/monitoring/treatment  BMI- 13.25/ evere Protein Malnutrition/Emaciated/Cachexia  Rheumatoid arthritis- contractures    Thank you,  Ritika Doshi RN, CCS  Clinical Documentation Integrity  Connect via [email or web address] Messenger  Options provided:   -- Agree with RN's assessment of pressure injuries   -- Disagree with assessment of pressure injuries   -- Unable to determine      Query created by: Ritika Doshi on 2021 11:55 AM    RESPONSE TEXT:    Agree with RN's assessment of pressure injuries          Electronically signed by:  BI GONCALVES MD 2021 7:00 AM
